# Patient Record
Sex: MALE | Race: WHITE | NOT HISPANIC OR LATINO
[De-identification: names, ages, dates, MRNs, and addresses within clinical notes are randomized per-mention and may not be internally consistent; named-entity substitution may affect disease eponyms.]

---

## 2017-02-01 ENCOUNTER — TRANSCRIPTION ENCOUNTER (OUTPATIENT)
Age: 52
End: 2017-02-01

## 2017-12-17 ENCOUNTER — TRANSCRIPTION ENCOUNTER (OUTPATIENT)
Age: 52
End: 2017-12-17

## 2019-10-15 ENCOUNTER — TRANSCRIPTION ENCOUNTER (OUTPATIENT)
Age: 54
End: 2019-10-15

## 2019-11-03 ENCOUNTER — FORM ENCOUNTER (OUTPATIENT)
Age: 54
End: 2019-11-03

## 2019-11-04 ENCOUNTER — OUTPATIENT (OUTPATIENT)
Dept: OUTPATIENT SERVICES | Facility: HOSPITAL | Age: 54
LOS: 1 days | End: 2019-11-04
Payer: COMMERCIAL

## 2019-11-04 ENCOUNTER — APPOINTMENT (OUTPATIENT)
Dept: ULTRASOUND IMAGING | Facility: CLINIC | Age: 54
End: 2019-11-04
Payer: COMMERCIAL

## 2019-11-04 ENCOUNTER — APPOINTMENT (OUTPATIENT)
Dept: UROLOGY | Facility: CLINIC | Age: 54
End: 2019-11-04
Payer: COMMERCIAL

## 2019-11-04 VITALS
HEIGHT: 71 IN | WEIGHT: 186 LBS | DIASTOLIC BLOOD PRESSURE: 88 MMHG | HEART RATE: 90 BPM | SYSTOLIC BLOOD PRESSURE: 140 MMHG | OXYGEN SATURATION: 97 % | BODY MASS INDEX: 26.04 KG/M2

## 2019-11-04 DIAGNOSIS — Z78.9 OTHER SPECIFIED HEALTH STATUS: ICD-10-CM

## 2019-11-04 DIAGNOSIS — Z80.42 FAMILY HISTORY OF MALIGNANT NEOPLASM OF PROSTATE: ICD-10-CM

## 2019-11-04 DIAGNOSIS — Z87.09 PERSONAL HISTORY OF OTHER DISEASES OF THE RESPIRATORY SYSTEM: ICD-10-CM

## 2019-11-04 DIAGNOSIS — Z00.8 ENCOUNTER FOR OTHER GENERAL EXAMINATION: ICD-10-CM

## 2019-11-04 PROBLEM — Z00.00 ENCOUNTER FOR PREVENTIVE HEALTH EXAMINATION: Status: ACTIVE | Noted: 2019-11-04

## 2019-11-04 PROCEDURE — 76870 US EXAM SCROTUM: CPT | Mod: 26

## 2019-11-04 PROCEDURE — 76870 US EXAM SCROTUM: CPT

## 2019-11-04 PROCEDURE — 99203 OFFICE O/P NEW LOW 30 MIN: CPT

## 2019-11-04 RX ORDER — FLUTICASONE PROPIONATE AND SALMETEROL 50; 100 UG/1; UG/1
100-50 POWDER RESPIRATORY (INHALATION)
Qty: 180 | Refills: 0 | Status: ACTIVE | COMMUNITY
Start: 2019-03-26

## 2019-11-04 RX ORDER — METHYLPREDNISOLONE 4 MG/1
4 TABLET ORAL
Qty: 21 | Refills: 0 | Status: ACTIVE | COMMUNITY
Start: 2019-10-15

## 2019-11-04 NOTE — PHYSICAL EXAM
[General Appearance - Well Developed] : well developed [Normal Appearance] : normal appearance [General Appearance - In No Acute Distress] : no acute distress [] : no respiratory distress [Abdomen Soft] : soft [Abdomen Tenderness] : non-tender [Abdomen Mass (___ Cm)] : no abdominal mass palpated [Costovertebral Angle Tenderness] : no ~M costovertebral angle tenderness [Urethral Meatus] : meatus normal [Penis Abnormality] : normal circumcised penis [FreeTextEntry1] : right enlarged scrotum, testis not felt separate from swelling, transillumination +, normal left testis pushed laterally  [Normal Station and Gait] : the gait and station were normal for the patient's age [Skin Color & Pigmentation] : normal skin color and pigmentation [No Focal Deficits] : no focal deficits [Oriented To Time, Place, And Person] : oriented to person, place, and time [No Palpable Adenopathy] : no palpable adenopathy

## 2019-11-04 NOTE — ASSESSMENT
[FreeTextEntry1] : Hydrocele:\par Discussed treatment options- continued observation, aspiration and surgical repair. Risks and benefits of each were discussed. \par The risks and complications of the procedure were extensively discussed with the patient. The general risks of this procedure include, but are not limited to bleeding, transfusion, infection, wound infection/dehiscence, pain, scaring of tissues, failure of the procedure, potential injury to other surrounding structures.\par The specific risks of this procedure include, but are not limited to: complete breakdown of the repair, prolonged wound drainage, injury to spermatic vessels, testicle and vas deferens which may lead to loss of testicle or future infertility problems. Possible injury to the ilioinguinal nerve is also possible and could lead to anesthetic areas on the scrotal, penile and inguinal skin. \par \par All questions were fully and satisfactorily answered. Pt will consider his options and if decides to decides to proceed will let us know. \par Will get Scrotal Ultrasound. Will inform results.\par

## 2019-11-04 NOTE — HISTORY OF PRESENT ILLNESS
[FreeTextEntry1] : 52 yo male presents for scrotal swelling. \par Right testis has been enlarged for years, last week increased swelling and some discomfort. \par Denies any difficulty with urination. \par Denies dysuria, hematuria, lower abdominal or flank pain, fever, chills or rigors.

## 2019-12-13 ENCOUNTER — APPOINTMENT (OUTPATIENT)
Dept: UROLOGY | Facility: CLINIC | Age: 54
End: 2019-12-13
Payer: COMMERCIAL

## 2019-12-13 VITALS — OXYGEN SATURATION: 98 % | SYSTOLIC BLOOD PRESSURE: 131 MMHG | HEART RATE: 80 BPM | DIASTOLIC BLOOD PRESSURE: 86 MMHG

## 2019-12-13 PROCEDURE — 76942 ECHO GUIDE FOR BIOPSY: CPT

## 2019-12-13 PROCEDURE — 55000 DRAINAGE OF HYDROCELE: CPT

## 2019-12-27 ENCOUNTER — APPOINTMENT (OUTPATIENT)
Dept: UROLOGY | Facility: CLINIC | Age: 54
End: 2019-12-27
Payer: COMMERCIAL

## 2019-12-27 PROCEDURE — 99213 OFFICE O/P EST LOW 20 MIN: CPT

## 2019-12-27 NOTE — PHYSICAL EXAM
[Urethral Meatus] : meatus normal [General Appearance - In No Acute Distress] : no acute distress [] : no respiratory distress [Oriented To Time, Place, And Person] : oriented to person, place, and time [Penis Abnormality] : normal circumcised penis [FreeTextEntry1] : right enlarged scrotum, less than before transillumination +

## 2019-12-27 NOTE — HISTORY OF PRESENT ILLNESS
[FreeTextEntry1] : 55 yo male presents for follow up. \par Has noticed increased swelling in the right scrotum, no pain or discomfort. \par \par Status post Ultrasound guided aspiration of right Hydrocele. \par \par Initially seen for scrotal swelling. \par Right testis has been enlarged for years, last week increased swelling and some discomfort. \par Denied any difficulty with urination. \par Denied dysuria, hematuria, lower abdominal or flank pain, fever, chills or rigors.

## 2020-02-25 ENCOUNTER — TRANSCRIPTION ENCOUNTER (OUTPATIENT)
Age: 55
End: 2020-02-25

## 2020-03-09 ENCOUNTER — FORM ENCOUNTER (OUTPATIENT)
Age: 55
End: 2020-03-09

## 2020-03-10 ENCOUNTER — OUTPATIENT (OUTPATIENT)
Dept: OUTPATIENT SERVICES | Facility: HOSPITAL | Age: 55
LOS: 1 days | End: 2020-03-10
Payer: COMMERCIAL

## 2020-03-10 VITALS
HEART RATE: 87 BPM | TEMPERATURE: 98 F | DIASTOLIC BLOOD PRESSURE: 81 MMHG | WEIGHT: 190.92 LBS | SYSTOLIC BLOOD PRESSURE: 117 MMHG | HEIGHT: 71 IN | OXYGEN SATURATION: 100 % | RESPIRATION RATE: 16 BRPM

## 2020-03-10 DIAGNOSIS — Z01.818 ENCOUNTER FOR OTHER PREPROCEDURAL EXAMINATION: ICD-10-CM

## 2020-03-10 DIAGNOSIS — Z98.890 OTHER SPECIFIED POSTPROCEDURAL STATES: Chronic | ICD-10-CM

## 2020-03-10 DIAGNOSIS — N43.3 HYDROCELE, UNSPECIFIED: ICD-10-CM

## 2020-03-10 LAB
ANION GAP SERPL CALC-SCNC: 5 MMOL/L — SIGNIFICANT CHANGE UP (ref 5–17)
APPEARANCE UR: CLEAR — SIGNIFICANT CHANGE UP
APTT BLD: 29.6 SEC — SIGNIFICANT CHANGE UP (ref 27.5–36.3)
BASOPHILS # BLD AUTO: 0.05 K/UL — SIGNIFICANT CHANGE UP (ref 0–0.2)
BASOPHILS NFR BLD AUTO: 1 % — SIGNIFICANT CHANGE UP (ref 0–2)
BILIRUB UR-MCNC: NEGATIVE — SIGNIFICANT CHANGE UP
BUN SERPL-MCNC: 16 MG/DL — SIGNIFICANT CHANGE UP (ref 7–23)
CALCIUM SERPL-MCNC: 9 MG/DL — SIGNIFICANT CHANGE UP (ref 8.5–10.1)
CHLORIDE SERPL-SCNC: 108 MMOL/L — SIGNIFICANT CHANGE UP (ref 96–108)
CO2 SERPL-SCNC: 28 MMOL/L — SIGNIFICANT CHANGE UP (ref 22–31)
COLOR SPEC: YELLOW — SIGNIFICANT CHANGE UP
CREAT SERPL-MCNC: 1.23 MG/DL — SIGNIFICANT CHANGE UP (ref 0.5–1.3)
DIFF PNL FLD: NEGATIVE — SIGNIFICANT CHANGE UP
EOSINOPHIL # BLD AUTO: 0.16 K/UL — SIGNIFICANT CHANGE UP (ref 0–0.5)
EOSINOPHIL NFR BLD AUTO: 3.1 % — SIGNIFICANT CHANGE UP (ref 0–6)
GLUCOSE SERPL-MCNC: 95 MG/DL — SIGNIFICANT CHANGE UP (ref 70–99)
GLUCOSE UR QL: NEGATIVE MG/DL — SIGNIFICANT CHANGE UP
HCT VFR BLD CALC: 42.3 % — SIGNIFICANT CHANGE UP (ref 39–50)
HGB BLD-MCNC: 14.1 G/DL — SIGNIFICANT CHANGE UP (ref 13–17)
IMM GRANULOCYTES NFR BLD AUTO: 0.4 % — SIGNIFICANT CHANGE UP (ref 0–1.5)
INR BLD: 0.98 RATIO — SIGNIFICANT CHANGE UP (ref 0.88–1.16)
KETONES UR-MCNC: NEGATIVE — SIGNIFICANT CHANGE UP
LEUKOCYTE ESTERASE UR-ACNC: NEGATIVE — SIGNIFICANT CHANGE UP
LYMPHOCYTES # BLD AUTO: 1.27 K/UL — SIGNIFICANT CHANGE UP (ref 1–3.3)
LYMPHOCYTES # BLD AUTO: 24.3 % — SIGNIFICANT CHANGE UP (ref 13–44)
MCHC RBC-ENTMCNC: 33.3 GM/DL — SIGNIFICANT CHANGE UP (ref 32–36)
MCHC RBC-ENTMCNC: 34.3 PG — HIGH (ref 27–34)
MCV RBC AUTO: 102.9 FL — HIGH (ref 80–100)
MONOCYTES # BLD AUTO: 0.47 K/UL — SIGNIFICANT CHANGE UP (ref 0–0.9)
MONOCYTES NFR BLD AUTO: 9 % — SIGNIFICANT CHANGE UP (ref 2–14)
NEUTROPHILS # BLD AUTO: 3.26 K/UL — SIGNIFICANT CHANGE UP (ref 1.8–7.4)
NEUTROPHILS NFR BLD AUTO: 62.2 % — SIGNIFICANT CHANGE UP (ref 43–77)
NITRITE UR-MCNC: NEGATIVE — SIGNIFICANT CHANGE UP
PH UR: 5 — SIGNIFICANT CHANGE UP (ref 5–8)
PLATELET # BLD AUTO: 288 K/UL — SIGNIFICANT CHANGE UP (ref 150–400)
POTASSIUM SERPL-MCNC: 4.3 MMOL/L — SIGNIFICANT CHANGE UP (ref 3.5–5.3)
POTASSIUM SERPL-SCNC: 4.3 MMOL/L — SIGNIFICANT CHANGE UP (ref 3.5–5.3)
PROT UR-MCNC: NEGATIVE MG/DL — SIGNIFICANT CHANGE UP
PROTHROM AB SERPL-ACNC: 10.9 SEC — SIGNIFICANT CHANGE UP (ref 10–12.9)
RBC # BLD: 4.11 M/UL — LOW (ref 4.2–5.8)
RBC # FLD: 12.7 % — SIGNIFICANT CHANGE UP (ref 10.3–14.5)
SODIUM SERPL-SCNC: 141 MMOL/L — SIGNIFICANT CHANGE UP (ref 135–145)
SP GR SPEC: 1.02 — SIGNIFICANT CHANGE UP (ref 1.01–1.02)
UROBILINOGEN FLD QL: NEGATIVE MG/DL — SIGNIFICANT CHANGE UP
WBC # BLD: 5.23 K/UL — SIGNIFICANT CHANGE UP (ref 3.8–10.5)
WBC # FLD AUTO: 5.23 K/UL — SIGNIFICANT CHANGE UP (ref 3.8–10.5)

## 2020-03-10 PROCEDURE — 93005 ELECTROCARDIOGRAM TRACING: CPT

## 2020-03-10 PROCEDURE — 81003 URINALYSIS AUTO W/O SCOPE: CPT

## 2020-03-10 PROCEDURE — 36415 COLL VENOUS BLD VENIPUNCTURE: CPT

## 2020-03-10 PROCEDURE — 85025 COMPLETE CBC W/AUTO DIFF WBC: CPT

## 2020-03-10 PROCEDURE — 80048 BASIC METABOLIC PNL TOTAL CA: CPT

## 2020-03-10 PROCEDURE — 87086 URINE CULTURE/COLONY COUNT: CPT

## 2020-03-10 PROCEDURE — 71046 X-RAY EXAM CHEST 2 VIEWS: CPT

## 2020-03-10 PROCEDURE — 86901 BLOOD TYPING SEROLOGIC RH(D): CPT

## 2020-03-10 PROCEDURE — 86900 BLOOD TYPING SEROLOGIC ABO: CPT

## 2020-03-10 PROCEDURE — 85610 PROTHROMBIN TIME: CPT

## 2020-03-10 PROCEDURE — 71046 X-RAY EXAM CHEST 2 VIEWS: CPT | Mod: 26

## 2020-03-10 PROCEDURE — G0463: CPT | Mod: 25

## 2020-03-10 PROCEDURE — 86850 RBC ANTIBODY SCREEN: CPT

## 2020-03-10 PROCEDURE — 93010 ELECTROCARDIOGRAM REPORT: CPT

## 2020-03-10 PROCEDURE — 85730 THROMBOPLASTIN TIME PARTIAL: CPT

## 2020-03-10 NOTE — H&P PST ADULT - HEALTH CARE MAINTENANCE
flu vaccine  current   Pt denies travel out of the country for the past 14 days denies cough fever shortness of breath

## 2020-03-10 NOTE — H&P PST ADULT - HISTORY OF PRESENT ILLNESS
54 year old male with right hydrocele.  Pt said hydrocele is increasing in size causing pressure and discomfort; denies fever chills, dysuria; he presents to PST for planned right hydrocelectomy

## 2020-03-10 NOTE — H&P PST ADULT - ASSESSMENT
54 year old male  he presents to PST for planned right hydrocelectomy     Plan:  1. PST instructions given ; NPO status instructions to be given by ASU   2. Pt instructed to take following meds with sip of water : advair sudafed   3. Labs EKG CXR as per surgeon request   4. Medical Optimization  with    5. Stop NSAIDS ( Aspirin Alev Motrin Mobic Diclofenac), herbal supplements , MVI , Vitamin fish oil 7 days prior to surgery  unless directed by surgeon or cardiologist; 54 year old male  he presents to PST for planned right hydrocelectomy     Plan:  1. PST instructions given ; NPO status instructions to be given by ASU   2. Pt instructed to take following meds with sip of water : advair   3. Labs EKG CXR as per surgeon request   4. Medical Optimization  with Dr   5. Stop NSAIDS ( Aspirin Alev Motrin Mobic Diclofenac), herbal supplements , MVI , Vitamin fish oil 7 days prior to surgery  unless directed by surgeon or cardiologist;

## 2020-03-10 NOTE — H&P PST ADULT - NSICDXPASTMEDICALHX_GEN_ALL_CORE_FT
PAST MEDICAL HISTORY:  Asthma controlled; never intubated    Disorder of Eustachian tube, bilateral     Hydrocele right    Neck pain

## 2020-03-10 NOTE — H&P PST ADULT - NSANTHOSAYNRD_GEN_A_CORE
No. HEMAL screening performed.  STOP BANG Legend: 0-2 = LOW Risk; 3-4 = INTERMEDIATE Risk; 5-8 = HIGH Risk

## 2020-03-11 DIAGNOSIS — N43.3 HYDROCELE, UNSPECIFIED: ICD-10-CM

## 2020-03-11 DIAGNOSIS — Z01.818 ENCOUNTER FOR OTHER PREPROCEDURAL EXAMINATION: ICD-10-CM

## 2020-03-11 LAB
CULTURE RESULTS: NO GROWTH — SIGNIFICANT CHANGE UP
SPECIMEN SOURCE: SIGNIFICANT CHANGE UP

## 2020-03-16 PROBLEM — N43.3 HYDROCELE, UNSPECIFIED: Chronic | Status: ACTIVE | Noted: 2020-03-10

## 2020-03-16 PROBLEM — H69.93 UNSPECIFIED EUSTACHIAN TUBE DISORDER, BILATERAL: Chronic | Status: ACTIVE | Noted: 2020-03-10

## 2020-03-16 PROBLEM — J45.909 UNSPECIFIED ASTHMA, UNCOMPLICATED: Chronic | Status: ACTIVE | Noted: 2020-03-10

## 2020-03-17 ENCOUNTER — APPOINTMENT (OUTPATIENT)
Dept: UROLOGY | Facility: HOSPITAL | Age: 55
End: 2020-03-17

## 2020-03-20 ENCOUNTER — APPOINTMENT (OUTPATIENT)
Dept: UROLOGY | Facility: CLINIC | Age: 55
End: 2020-03-20

## 2020-03-20 ENCOUNTER — EMERGENCY (EMERGENCY)
Facility: HOSPITAL | Age: 55
LOS: 0 days | Discharge: HOME | End: 2020-03-20
Attending: EMERGENCY MEDICINE
Payer: COMMERCIAL

## 2020-03-20 VITALS
RESPIRATION RATE: 17 BRPM | HEART RATE: 80 BPM | OXYGEN SATURATION: 100 % | SYSTOLIC BLOOD PRESSURE: 152 MMHG | DIASTOLIC BLOOD PRESSURE: 103 MMHG

## 2020-03-20 VITALS — WEIGHT: 195.11 LBS | HEIGHT: 71 IN

## 2020-03-20 DIAGNOSIS — N50.811 RIGHT TESTICULAR PAIN: ICD-10-CM

## 2020-03-20 DIAGNOSIS — N50.89 OTHER SPECIFIED DISORDERS OF THE MALE GENITAL ORGANS: ICD-10-CM

## 2020-03-20 DIAGNOSIS — J45.909 UNSPECIFIED ASTHMA, UNCOMPLICATED: ICD-10-CM

## 2020-03-20 DIAGNOSIS — Z98.890 OTHER SPECIFIED POSTPROCEDURAL STATES: ICD-10-CM

## 2020-03-20 DIAGNOSIS — I10 ESSENTIAL (PRIMARY) HYPERTENSION: ICD-10-CM

## 2020-03-20 DIAGNOSIS — Z88.2 ALLERGY STATUS TO SULFONAMIDES: ICD-10-CM

## 2020-03-20 DIAGNOSIS — N43.3 HYDROCELE, UNSPECIFIED: ICD-10-CM

## 2020-03-20 DIAGNOSIS — Z91.09 OTHER ALLERGY STATUS, OTHER THAN TO DRUGS AND BIOLOGICAL SUBSTANCES: ICD-10-CM

## 2020-03-20 DIAGNOSIS — Z86.69 PERSONAL HISTORY OF OTHER DISEASES OF THE NERVOUS SYSTEM AND SENSE ORGANS: ICD-10-CM

## 2020-03-20 DIAGNOSIS — Z98.890 OTHER SPECIFIED POSTPROCEDURAL STATES: Chronic | ICD-10-CM

## 2020-03-20 DIAGNOSIS — Z79.51 LONG TERM (CURRENT) USE OF INHALED STEROIDS: ICD-10-CM

## 2020-03-20 DIAGNOSIS — N50.812 LEFT TESTICULAR PAIN: ICD-10-CM

## 2020-03-20 LAB
APPEARANCE UR: CLEAR — SIGNIFICANT CHANGE UP
BILIRUB UR-MCNC: NEGATIVE — SIGNIFICANT CHANGE UP
COLOR SPEC: YELLOW — SIGNIFICANT CHANGE UP
DIFF PNL FLD: NEGATIVE — SIGNIFICANT CHANGE UP
GLUCOSE UR QL: NEGATIVE MG/DL — SIGNIFICANT CHANGE UP
KETONES UR-MCNC: NEGATIVE — SIGNIFICANT CHANGE UP
LEUKOCYTE ESTERASE UR-ACNC: ABNORMAL
NITRITE UR-MCNC: NEGATIVE — SIGNIFICANT CHANGE UP
PH UR: 7 — SIGNIFICANT CHANGE UP (ref 5–8)
PROT UR-MCNC: NEGATIVE MG/DL — SIGNIFICANT CHANGE UP
SP GR SPEC: 1.01 — SIGNIFICANT CHANGE UP (ref 1.01–1.02)
UROBILINOGEN FLD QL: NEGATIVE MG/DL — SIGNIFICANT CHANGE UP

## 2020-03-20 PROCEDURE — 81001 URINALYSIS AUTO W/SCOPE: CPT

## 2020-03-20 PROCEDURE — 76870 US EXAM SCROTUM: CPT

## 2020-03-20 PROCEDURE — 71045 X-RAY EXAM CHEST 1 VIEW: CPT

## 2020-03-20 PROCEDURE — 76870 US EXAM SCROTUM: CPT | Mod: 26

## 2020-03-20 PROCEDURE — 87086 URINE CULTURE/COLONY COUNT: CPT

## 2020-03-20 PROCEDURE — 99284 EMERGENCY DEPT VISIT MOD MDM: CPT | Mod: 25

## 2020-03-20 PROCEDURE — 99284 EMERGENCY DEPT VISIT MOD MDM: CPT

## 2020-03-20 PROCEDURE — 71045 X-RAY EXAM CHEST 1 VIEW: CPT | Mod: 26

## 2020-03-20 RX ORDER — ACETAMINOPHEN 500 MG
1000 TABLET ORAL ONCE
Refills: 0 | Status: COMPLETED | OUTPATIENT
Start: 2020-03-20 | End: 2020-03-20

## 2020-03-20 RX ADMIN — Medication 1000 MILLIGRAM(S): at 18:58

## 2020-03-20 NOTE — ED STATDOCS - PROGRESS NOTE DETAILS
54 yr. old male PMH: Asthma, Hydrocele presents to ED with testicular swelling and pain. Reports he was due for surgery last Tuesday and procedure was cancelled. Feels the hydrocele ruptured. No fever or chills. No recent travel. Seen and examined by attending in intake. Plan: US, Tylenol and UA/UC. Will F/U with results and re evaluate. Anabela CANDELARIO Dr. Silverio called and consulted covering for Dr. Terry. Reviewed results of US with Dr. Silverio and PE: abd. non tender , BS+ no organomegaly. Scrotum non tender + swelling but soft. MTangredi NP Reviewed results of Lab work and US with patient and agreed to be discharged amnd F/U with Dr. Terry. Anabela CANDELARIO

## 2020-03-20 NOTE — ED STATDOCS - PATIENT PORTAL LINK FT
You can access the FollowMyHealth Patient Portal offered by [facility] by registering at the following website: [email or web address]. By joining [employer]’s FollowMyHealth portal, you will also be able to view your health information using other applications (apps) compatible with our system.

## 2020-03-20 NOTE — ED STATDOCS - CLINICAL SUMMARY MEDICAL DECISION MAKING FREE TEXT BOX
Plan: Sonogram of testes to r/o torsion and eval for size and rupture of hydrocele, will contact Dr. Terry. Plan: Sonogram of testes to r/o torsion and eval for size and rupture of hydrocele, will contact Dr. Terry. Contacted Dr. VERONIQUE Silverio with results of US and agreed to discharge patient to F/U with Dr. Terry. Plan: Sonogram of testes to r/o torsion and eval for size and rupture of hydrocele, will contact Dr. Terry. Contacted Dr. VERONIQUE Silverio with results of US and agreed to discharge patient to F/U with Dr. Teryr.    Veronika SOARES: Spoke with Dr. Jass Gallego who will follow up with patient outpatient and is OK with care provided in the ED. Outpatient follow up is appreciated.

## 2020-03-20 NOTE — ED PROVIDER NOTE - PROGRESS NOTE DETAILS
Patient is very anxious. Hypertensive around 150/102 on repeat BP. Had a full discussion with patient. Patient states he is a patient of Dr. Gallego. Discussed the benefit of treatment in the ED vs. outpatient treatment. Given that patient has anxiety, and BP is likely from anxiety, wishes to go home. x-ray is negative for CHF findings, repeated discussion with Dr. Silverio after repeated exam is unremarkable. Patient opts to go home, will provide patient with a script for amlodipine. However fully educated patient explaining that new BP modulating medications need time to show their efficacy and that generally it is not safe to start a patient on BP modulating medication specially since patient is likey anxious and HTN could likely from it. Patient understands, states will not take the meds until calling Dr. Gallego tomorrow and repeating his BP in the morning.

## 2020-03-20 NOTE — ED STATDOCS - NS_ ATTENDINGSCRIBEDETAILS _ED_A_ED_FT
I Khang Banda MD saw and examined the patient. Scribe documented for me and under my supervision. I have modified the scribe's documentation where necessary to reflect my history, physical exam and other relevant documentations pertinent to the care of the patient.

## 2020-03-20 NOTE — ED STATDOCS - NSFOLLOWUPINSTRUCTIONS_ED_ALL_ED_FT
EnglishSpanish    Hydrocele, Adult  A hydrocele is a collection of fluid in the loose pouch of skin that holds the testicles (scrotum). This may happen because:  The amount of fluid produced in the scrotum is not absorbed by the rest of the body.Fluid from the abdomen fills the scrotum. Normally, the testicles develop in the abdomen then move (drop) into to the scrotum before birth. The tube that the testicles travel through usually closes after the testicles drop. If the tube does not close, fluid from the abdomen can fill the scrotum. This is less common in adults.What are the causes?  The cause of a hydrocele in adults is usually not known. However, it may be caused by:  An injury to the scrotum.An infection (epididymitis).Decreased blood flow to the scrotum.Twisting of a testicle (testicular torsion).A birth defect.A tumor or cancer of the testicle.What are the signs or symptoms?  A hydrocele feels like a water-filled balloon. It may also feel heavy. Other symptoms include:  Swelling of the scrotum. The swelling may decrease when you lie down. You may also notice more swelling at night than in the morning.Swelling of the groin.Mild discomfort in the scrotum.Pain. This can develop if the hydrocele was caused by infection or twisting. The larger the hydrocele, the more likely you are to have pain.How is this diagnosed?  This condition may be diagnosed based on:  Physical exam.Medical history.You may also have other tests, including:  Imaging tests, such as ultrasound.Blood or urine tests.How is this treated?  Most hydroceles go away on their own. If you have no discomfort or pain, your health care provider may suggest close monitoring of your condition (called watch and wait or watchful waiting) until the condition goes away or symptoms develop. If treatment is needed, it may include:  Treating an underlying condition. This may include using an antibiotic medicine to treat an infection.Surgery to stop fluid from collecting in the scrotum.Surgery to drain the fluid. Options include:  Needle aspiration. A needle is used to drain fluid. However, the fluid buildup will come back quickly.Hydrocelectomy. For this procedure, an incision is made in the scrotum to remove the fluid sac.Follow these instructions at home:  Watch the hydrocele for any changes.Take over-the-counter and prescription medicines only as told by your health care provider.If you were prescribed an antibiotic medicine, use it as told by your health care provider. Do not stop taking the antibiotic even if you start to feel better.Keep all follow-up visits as told by your health care provider. This is important.Contact a health care provider if:  You notice any changes in the hydrocele.The swelling in your scrotum or groin gets worse.The hydrocele becomes red, firm, painful, or tender to the touch.You have a fever.Get help right away if you:  Develop a lot of pain, or your pain becomes worse.Summary  A hydrocele is a collection of fluid in the loose pouch of skin that holds the testicles (scrotum).Hydroceles can cause swelling, discomfort, and sometimes pain.In adults, the cause of a hydrocele usually is not known. However, it is sometimes caused by an infection or a rotation and twisting of the scrotum.Treatment is usually not needed. Hydroceles often go away on their own. If a hydrocele causes pain, treatment may be given to ease the pain.This information is not intended to replace advice given to you by your health care provider. Make sure you discuss any questions you have with your health care provider.    Rest. Drink plenty of fluids. Follow up with Dr. Terry. Hydrocele, Adult  A hydrocele is a collection of fluid in the loose pouch of skin that holds the testicles (scrotum). This may happen because:  The amount of fluid produced in the scrotum is not absorbed by the rest of the body.Fluid from the abdomen fills the scrotum. Normally, the testicles develop in the abdomen then move (drop) into to the scrotum before birth. The tube that the testicles travel through usually closes after the testicles drop. If the tube does not close, fluid from the abdomen can fill the scrotum. This is less common in adults.What are the causes?  The cause of a hydrocele in adults is usually not known. However, it may be caused by:  An injury to the scrotum.An infection (epididymitis).Decreased blood flow to the scrotum.Twisting of a testicle (testicular torsion).A birth defect.A tumor or cancer of the testicle.What are the signs or symptoms?  A hydrocele feels like a water-filled balloon. It may also feel heavy. Other symptoms include:  Swelling of the scrotum. The swelling may decrease when you lie down. You may also notice more swelling at night than in the morning.Swelling of the groin.Mild discomfort in the scrotum.Pain. This can develop if the hydrocele was caused by infection or twisting. The larger the hydrocele, the more likely you are to have pain.How is this diagnosed?  This condition may be diagnosed based on:  Physical exam.Medical history.You may also have other tests, including:  Imaging tests, such as ultrasound.Blood or urine tests.How is this treated?  Most hydroceles go away on their own. If you have no discomfort or pain, your health care provider may suggest close monitoring of your condition (called watch and wait or watchful waiting) until the condition goes away or symptoms develop. If treatment is needed, it may include:  Treating an underlying condition. This may include using an antibiotic medicine to treat an infection.Surgery to stop fluid from collecting in the scrotum.Surgery to drain the fluid. Options include:  Needle aspiration. A needle is used to drain fluid. However, the fluid buildup will come back quickly.Hydrocelectomy. For this procedure, an incision is made in the scrotum to remove the fluid sac.Follow these instructions at home:  Watch the hydrocele for any changes.Take over-the-counter and prescription medicines only as told by your health care provider.If you were prescribed an antibiotic medicine, use it as told by your health care provider. Do not stop taking the antibiotic even if you start to feel better.Keep all follow-up visits as told by your health care provider. This is important.Contact a health care provider if:  You notice any changes in the hydrocele.The swelling in your scrotum or groin gets worse.The hydrocele becomes red, firm, painful, or tender to the touch.You have a fever.Get help right away if you:  Develop a lot of pain, or your pain becomes worse.Summary  A hydrocele is a collection of fluid in the loose pouch of skin that holds the testicles (scrotum).Hydroceles can cause swelling, discomfort, and sometimes pain.In adults, the cause of a hydrocele usually is not known. However, it is sometimes caused by an infection or a rotation and twisting of the scrotum.Treatment is usually not needed. Hydroceles often go away on their own. If a hydrocele causes pain, treatment may be given to ease the pain.This information is not intended to replace advice given to you by your health care provider. Make sure you discuss any questions you have with your health care provider.    Rest. Drink plenty of fluids. Follow up with Dr. Terry.

## 2020-03-20 NOTE — ED STATDOCS - PMH
Asthma  controlled; never intubated  Disorder of Eustachian tube, bilateral    Hydrocele  right  Neck pain

## 2020-03-20 NOTE — ED STATDOCS - OBJECTIVE STATEMENT
55 y/o M with a PMHx of Asthma, Hydrocele presenting to the ED c/o testicular pain. Patient reports that he was a known hydrocele, and was due for surgery for x4 days ago but was cancelled. Patient reports that he was due for drainage of the area today, and believes that the hydrocele ruptured, with increased pain and swelling. Severity of the pain is a 6/10. Denies recent travel, SOB, cough, N/V/D, fever or chills. No illicit drug use, Non smoker, Social EtOH use.

## 2020-03-20 NOTE — ED STATDOCS - GENITOURINARY, MLM
normal... no bladder tenderness, no bilateral CVA tenderness. +TTP of b/l right more than left, swelling in both testes R more than left intact, sensations in inner thigh intact, no erythema or sign of infection

## 2020-03-20 NOTE — ED STATDOCS - MUSCULOSKELETAL, MLM
range of motion is not limited and there is no muscle tenderness. range of motion is not limited and there is no muscle tenderness. 5/5 strength on flexion and extension of all limbs. No nuchal rigidity. NIH=0 GCS=15

## 2020-03-20 NOTE — ED ADULT NURSE NOTE - OBJECTIVE STATEMENT
Patient presenting to the ED c/o testicular pain. Patient reports that he was a known hydrocele, and was due for surgery for x4 days ago but was cancelled. Patient reports that he was due for drainage of the area today, and believes that the hydrocele ruptured, with increased pain and swelling. Severity of the pain is a 6/10.

## 2020-03-20 NOTE — ED STATDOCS - ATTENDING CONTRIBUTION TO CARE
I Khang Banda MD saw and examined the patient. MLP saw and examined the patient under my supervision. I discussed the care of the patient with MLP and agree with MLP's plan, assessment and care of the patient while in the ED.

## 2020-03-20 NOTE — ED STATDOCS - CARE PROVIDER_API CALL
Sukhdev Terry)  Urology  [address] [facility], [address] [place] [address]  Phone: [phone]  Fax: [phone]  Follow Up Time:

## 2020-03-21 LAB
CULTURE RESULTS: SIGNIFICANT CHANGE UP
SPECIMEN SOURCE: SIGNIFICANT CHANGE UP

## 2020-03-24 ENCOUNTER — OTHER (OUTPATIENT)
Age: 55
End: 2020-03-24

## 2020-03-25 ENCOUNTER — APPOINTMENT (OUTPATIENT)
Dept: UROLOGY | Facility: CLINIC | Age: 55
End: 2020-03-25

## 2020-04-01 ENCOUNTER — APPOINTMENT (OUTPATIENT)
Dept: UROLOGY | Facility: CLINIC | Age: 55
End: 2020-04-01

## 2020-05-01 ENCOUNTER — EMERGENCY (EMERGENCY)
Facility: HOSPITAL | Age: 55
LOS: 0 days | Discharge: HOME | End: 2020-05-01
Attending: EMERGENCY MEDICINE
Payer: COMMERCIAL

## 2020-05-01 VITALS
HEART RATE: 80 BPM | DIASTOLIC BLOOD PRESSURE: 68 MMHG | RESPIRATION RATE: 16 BRPM | TEMPERATURE: 98 F | SYSTOLIC BLOOD PRESSURE: 134 MMHG | OXYGEN SATURATION: 100 %

## 2020-05-01 VITALS — WEIGHT: 175.05 LBS

## 2020-05-01 DIAGNOSIS — Z20.828 CONTACT WITH AND (SUSPECTED) EXPOSURE TO OTHER VIRAL COMMUNICABLE DISEASES: ICD-10-CM

## 2020-05-01 DIAGNOSIS — R06.02 SHORTNESS OF BREATH: ICD-10-CM

## 2020-05-01 DIAGNOSIS — Z79.51 LONG TERM (CURRENT) USE OF INHALED STEROIDS: ICD-10-CM

## 2020-05-01 DIAGNOSIS — Z88.2 ALLERGY STATUS TO SULFONAMIDES: ICD-10-CM

## 2020-05-01 DIAGNOSIS — J45.909 UNSPECIFIED ASTHMA, UNCOMPLICATED: ICD-10-CM

## 2020-05-01 DIAGNOSIS — Z91.09 OTHER ALLERGY STATUS, OTHER THAN TO DRUGS AND BIOLOGICAL SUBSTANCES: ICD-10-CM

## 2020-05-01 DIAGNOSIS — Z98.890 OTHER SPECIFIED POSTPROCEDURAL STATES: Chronic | ICD-10-CM

## 2020-05-01 LAB
ALBUMIN SERPL ELPH-MCNC: 4 G/DL — SIGNIFICANT CHANGE UP (ref 3.3–5)
ALP SERPL-CCNC: 97 U/L — SIGNIFICANT CHANGE UP (ref 40–120)
ALT FLD-CCNC: 43 U/L — SIGNIFICANT CHANGE UP (ref 12–78)
ANION GAP SERPL CALC-SCNC: 6 MMOL/L — SIGNIFICANT CHANGE UP (ref 5–17)
AST SERPL-CCNC: 30 U/L — SIGNIFICANT CHANGE UP (ref 15–37)
BILIRUB SERPL-MCNC: 0.4 MG/DL — SIGNIFICANT CHANGE UP (ref 0.2–1.2)
BUN SERPL-MCNC: 18 MG/DL — SIGNIFICANT CHANGE UP (ref 7–23)
CALCIUM SERPL-MCNC: 9 MG/DL — SIGNIFICANT CHANGE UP (ref 8.5–10.1)
CHLORIDE SERPL-SCNC: 106 MMOL/L — SIGNIFICANT CHANGE UP (ref 96–108)
CO2 SERPL-SCNC: 26 MMOL/L — SIGNIFICANT CHANGE UP (ref 22–31)
CREAT SERPL-MCNC: 1.05 MG/DL — SIGNIFICANT CHANGE UP (ref 0.5–1.3)
D DIMER BLD IA.RAPID-MCNC: 195 NG/ML DDU — SIGNIFICANT CHANGE UP
GLUCOSE SERPL-MCNC: 89 MG/DL — SIGNIFICANT CHANGE UP (ref 70–99)
HCT VFR BLD CALC: 41.9 % — SIGNIFICANT CHANGE UP (ref 39–50)
HGB BLD-MCNC: 13.9 G/DL — SIGNIFICANT CHANGE UP (ref 13–17)
MCHC RBC-ENTMCNC: 33.2 GM/DL — SIGNIFICANT CHANGE UP (ref 32–36)
MCHC RBC-ENTMCNC: 33.4 PG — SIGNIFICANT CHANGE UP (ref 27–34)
MCV RBC AUTO: 100.7 FL — HIGH (ref 80–100)
PLATELET # BLD AUTO: 259 K/UL — SIGNIFICANT CHANGE UP (ref 150–400)
POTASSIUM SERPL-MCNC: 3.7 MMOL/L — SIGNIFICANT CHANGE UP (ref 3.5–5.3)
POTASSIUM SERPL-SCNC: 3.7 MMOL/L — SIGNIFICANT CHANGE UP (ref 3.5–5.3)
PROT SERPL-MCNC: 7.5 GM/DL — SIGNIFICANT CHANGE UP (ref 6–8.3)
RBC # BLD: 4.16 M/UL — LOW (ref 4.2–5.8)
RBC # FLD: 13 % — SIGNIFICANT CHANGE UP (ref 10.3–14.5)
SARS-COV-2 RNA SPEC QL NAA+PROBE: SIGNIFICANT CHANGE UP
SODIUM SERPL-SCNC: 138 MMOL/L — SIGNIFICANT CHANGE UP (ref 135–145)
TROPONIN I SERPL-MCNC: <0.015 NG/ML — SIGNIFICANT CHANGE UP (ref 0.01–0.04)
WBC # BLD: 5.2 K/UL — SIGNIFICANT CHANGE UP (ref 3.8–10.5)
WBC # FLD AUTO: 5.2 K/UL — SIGNIFICANT CHANGE UP (ref 3.8–10.5)

## 2020-05-01 PROCEDURE — 71045 X-RAY EXAM CHEST 1 VIEW: CPT

## 2020-05-01 PROCEDURE — 84484 ASSAY OF TROPONIN QUANT: CPT

## 2020-05-01 PROCEDURE — 71045 X-RAY EXAM CHEST 1 VIEW: CPT | Mod: 26

## 2020-05-01 PROCEDURE — 80053 COMPREHEN METABOLIC PANEL: CPT

## 2020-05-01 PROCEDURE — 36415 COLL VENOUS BLD VENIPUNCTURE: CPT

## 2020-05-01 PROCEDURE — 93010 ELECTROCARDIOGRAM REPORT: CPT

## 2020-05-01 PROCEDURE — 99284 EMERGENCY DEPT VISIT MOD MDM: CPT | Mod: 25

## 2020-05-01 PROCEDURE — 87635 SARS-COV-2 COVID-19 AMP PRB: CPT

## 2020-05-01 PROCEDURE — 93005 ELECTROCARDIOGRAM TRACING: CPT

## 2020-05-01 PROCEDURE — 99285 EMERGENCY DEPT VISIT HI MDM: CPT

## 2020-05-01 PROCEDURE — 85379 FIBRIN DEGRADATION QUANT: CPT

## 2020-05-01 PROCEDURE — 85027 COMPLETE CBC AUTOMATED: CPT

## 2020-05-01 NOTE — ED PROVIDER NOTE - CLINICAL SUMMARY MEDICAL DECISION MAKING FREE TEXT BOX
Pt well appearing.  Given ongoing SOB told to come to ED for eval by Dr. Gallego.  EKG non-ischemic.  no active cp or exertional symptoms to suggest acs.  HEART score is 1.  Pt PERC negative aside from age and no preceding risk factors for PE.  No e/o ptx/pna/carditis.  Story not c/w dissection - below threshold for further w/u.  No suggestion of asthma on exam.  Will send single troponin given timing of symptoms.  Dispo pending labs, imaging and reassessment. Pt well appearing.  Given ongoing SOB told to come to ED for eval by Dr. Gallego.  EKG non-ischemic.  no active cp or exertional symptoms to suggest acs.  HEART score is 1.  Pt PERC negative aside from age and no preceding risk factors for PE.  Dimer negative.  No e/o ptx/pna/carditis.  Story not c/w dissection - below threshold for further w/u.  No suggestion of asthma on exam.  Will send single troponin given timing of symptoms.  Dispo pending labs, imaging and reassessment.

## 2020-05-01 NOTE — ED PROVIDER NOTE - OBJECTIVE STATEMENT
54 y M pmh of asthma presenting with sob ongoing for last 2-3 weeks.  Denies f/c/n/v/d/cough.  No CP.  No exertional component.  Refractory to ventolin and advair.  Gets this annually typically, but this has been lasting longer.  Was covid antibody tested a week ago, but no results yet.  Does do EMS runs.  No LE edema.  No recent surgeries or travel.

## 2020-05-01 NOTE — ED PROVIDER NOTE - NS ED ROS FT
Constitutional: nad, well appearing  HEENT:  no nasal congestion, eye drainage or ear pain.    CVS:  no cp  Resp:  + sob, no cough  GI:  no abdominal pain, no nausea or vomiting  :  no dysuria  MSK: no joint pain or limited ROM  Skin: no rash  Neuro: no change in mental status or level of consciousness  Heme/lymph: no bleeding

## 2020-05-01 NOTE — ED PROVIDER NOTE - PATIENT PORTAL LINK FT
You can access the FollowMyHealth Patient Portal offered by [facility] by registering at the following website: [email or web address]. By joining [employer]’s FollowMyHealth portal, you will also be able to view your health information using other applications (apps) compatible with our system.

## 2020-05-01 NOTE — ED ADULT NURSE NOTE - OBJECTIVE STATEMENT
pt presents to ed from home, pt alert and orientedx4, VSS afebrule, pt c/o multiple weeks of SOb, no cough congestion, chest pain dizzines sand weakness. pt states he is an asthmatic and feels his proventil inhaler is uneffective. pt states he was not swabbed for covid but had antibody testing performed one week ago and has not recevied his results yet, safety maintained, pt speaking clearly with no signs of repsiratory distress, pt on room air, will continue to monitor

## 2020-05-01 NOTE — ED ADULT TRIAGE NOTE - CHIEF COMPLAINT QUOTE
patient presents to Emergency room due to complaints of shortness of breathe. worsening over the past few weeks antibody testing done last Saturday no results yet

## 2020-05-01 NOTE — ED PROVIDER NOTE - PHYSICAL EXAMINATION
Constitutional: NAD, well appearing  HEENT: no rhinorrhea  CVS:  RRR, no m/r/g  Resp:  CTAB  GI: soft, ntnd  MSK:  no restriction to rom, full ROM to all extremities  Neuro:  A&Ox3, 5/5 strength to all extremities,  SILT to all extremities  Skin: no rash  psych: clear thought content  Heme/lymph:  No LAD

## 2020-07-28 ENCOUNTER — OUTPATIENT (OUTPATIENT)
Dept: OUTPATIENT SERVICES | Facility: HOSPITAL | Age: 55
LOS: 1 days | End: 2020-07-28
Payer: COMMERCIAL

## 2020-07-28 VITALS
WEIGHT: 188.94 LBS | TEMPERATURE: 98 F | RESPIRATION RATE: 16 BRPM | SYSTOLIC BLOOD PRESSURE: 135 MMHG | DIASTOLIC BLOOD PRESSURE: 81 MMHG | HEIGHT: 71 IN | HEART RATE: 84 BPM | OXYGEN SATURATION: 100 %

## 2020-07-28 DIAGNOSIS — N43.3 HYDROCELE, UNSPECIFIED: ICD-10-CM

## 2020-07-28 DIAGNOSIS — Z98.890 OTHER SPECIFIED POSTPROCEDURAL STATES: Chronic | ICD-10-CM

## 2020-07-28 DIAGNOSIS — Z01.818 ENCOUNTER FOR OTHER PREPROCEDURAL EXAMINATION: ICD-10-CM

## 2020-07-28 LAB
ANION GAP SERPL CALC-SCNC: 4 MMOL/L — LOW (ref 5–17)
APPEARANCE UR: CLEAR — SIGNIFICANT CHANGE UP
APTT BLD: 31.8 SEC — SIGNIFICANT CHANGE UP (ref 27.5–35.5)
BASOPHILS # BLD AUTO: 0.05 K/UL — SIGNIFICANT CHANGE UP (ref 0–0.2)
BASOPHILS NFR BLD AUTO: 0.9 % — SIGNIFICANT CHANGE UP (ref 0–2)
BILIRUB UR-MCNC: NEGATIVE — SIGNIFICANT CHANGE UP
BUN SERPL-MCNC: 14 MG/DL — SIGNIFICANT CHANGE UP (ref 7–23)
CALCIUM SERPL-MCNC: 8.5 MG/DL — SIGNIFICANT CHANGE UP (ref 8.5–10.1)
CHLORIDE SERPL-SCNC: 110 MMOL/L — HIGH (ref 96–108)
CO2 SERPL-SCNC: 29 MMOL/L — SIGNIFICANT CHANGE UP (ref 22–31)
COLOR SPEC: YELLOW — SIGNIFICANT CHANGE UP
CREAT SERPL-MCNC: 1.05 MG/DL — SIGNIFICANT CHANGE UP (ref 0.5–1.3)
DIFF PNL FLD: ABNORMAL
EOSINOPHIL # BLD AUTO: 0.2 K/UL — SIGNIFICANT CHANGE UP (ref 0–0.5)
EOSINOPHIL NFR BLD AUTO: 3.7 % — SIGNIFICANT CHANGE UP (ref 0–6)
GLUCOSE SERPL-MCNC: 90 MG/DL — SIGNIFICANT CHANGE UP (ref 70–99)
GLUCOSE UR QL: NEGATIVE MG/DL — SIGNIFICANT CHANGE UP
HCT VFR BLD CALC: 40.2 % — SIGNIFICANT CHANGE UP (ref 39–50)
HGB BLD-MCNC: 13.4 G/DL — SIGNIFICANT CHANGE UP (ref 13–17)
IMM GRANULOCYTES NFR BLD AUTO: 0.2 % — SIGNIFICANT CHANGE UP (ref 0–1.5)
INR BLD: 0.92 RATIO — SIGNIFICANT CHANGE UP (ref 0.88–1.16)
KETONES UR-MCNC: NEGATIVE — SIGNIFICANT CHANGE UP
LEUKOCYTE ESTERASE UR-ACNC: NEGATIVE — SIGNIFICANT CHANGE UP
LYMPHOCYTES # BLD AUTO: 1.01 K/UL — SIGNIFICANT CHANGE UP (ref 1–3.3)
LYMPHOCYTES # BLD AUTO: 18.5 % — SIGNIFICANT CHANGE UP (ref 13–44)
MCHC RBC-ENTMCNC: 33.3 GM/DL — SIGNIFICANT CHANGE UP (ref 32–36)
MCHC RBC-ENTMCNC: 33.8 PG — SIGNIFICANT CHANGE UP (ref 27–34)
MCV RBC AUTO: 101.5 FL — HIGH (ref 80–100)
MONOCYTES # BLD AUTO: 0.6 K/UL — SIGNIFICANT CHANGE UP (ref 0–0.9)
MONOCYTES NFR BLD AUTO: 11 % — SIGNIFICANT CHANGE UP (ref 2–14)
NEUTROPHILS # BLD AUTO: 3.59 K/UL — SIGNIFICANT CHANGE UP (ref 1.8–7.4)
NEUTROPHILS NFR BLD AUTO: 65.7 % — SIGNIFICANT CHANGE UP (ref 43–77)
NITRITE UR-MCNC: NEGATIVE — SIGNIFICANT CHANGE UP
PH UR: 7 — SIGNIFICANT CHANGE UP (ref 5–8)
PLATELET # BLD AUTO: 254 K/UL — SIGNIFICANT CHANGE UP (ref 150–400)
POTASSIUM SERPL-MCNC: 4.2 MMOL/L — SIGNIFICANT CHANGE UP (ref 3.5–5.3)
POTASSIUM SERPL-SCNC: 4.2 MMOL/L — SIGNIFICANT CHANGE UP (ref 3.5–5.3)
PROT UR-MCNC: NEGATIVE MG/DL — SIGNIFICANT CHANGE UP
PROTHROM AB SERPL-ACNC: 10.7 SEC — SIGNIFICANT CHANGE UP (ref 10.6–13.6)
RBC # BLD: 3.96 M/UL — LOW (ref 4.2–5.8)
RBC # FLD: 12.7 % — SIGNIFICANT CHANGE UP (ref 10.3–14.5)
SODIUM SERPL-SCNC: 143 MMOL/L — SIGNIFICANT CHANGE UP (ref 135–145)
SP GR SPEC: 1.01 — SIGNIFICANT CHANGE UP (ref 1.01–1.02)
UROBILINOGEN FLD QL: 1 MG/DL
WBC # BLD: 5.46 K/UL — SIGNIFICANT CHANGE UP (ref 3.8–10.5)
WBC # FLD AUTO: 5.46 K/UL — SIGNIFICANT CHANGE UP (ref 3.8–10.5)

## 2020-07-28 PROCEDURE — 93005 ELECTROCARDIOGRAM TRACING: CPT

## 2020-07-28 PROCEDURE — 93010 ELECTROCARDIOGRAM REPORT: CPT

## 2020-07-28 PROCEDURE — G0463: CPT | Mod: 25

## 2020-07-28 PROCEDURE — 85610 PROTHROMBIN TIME: CPT

## 2020-07-28 PROCEDURE — 81001 URINALYSIS AUTO W/SCOPE: CPT

## 2020-07-28 PROCEDURE — 86850 RBC ANTIBODY SCREEN: CPT

## 2020-07-28 PROCEDURE — 86900 BLOOD TYPING SEROLOGIC ABO: CPT

## 2020-07-28 PROCEDURE — 85025 COMPLETE CBC W/AUTO DIFF WBC: CPT

## 2020-07-28 PROCEDURE — 86901 BLOOD TYPING SEROLOGIC RH(D): CPT

## 2020-07-28 PROCEDURE — 80048 BASIC METABOLIC PNL TOTAL CA: CPT

## 2020-07-28 PROCEDURE — 87086 URINE CULTURE/COLONY COUNT: CPT

## 2020-07-28 PROCEDURE — 36415 COLL VENOUS BLD VENIPUNCTURE: CPT

## 2020-07-28 PROCEDURE — 85730 THROMBOPLASTIN TIME PARTIAL: CPT

## 2020-07-28 RX ORDER — FLUTICASONE PROPIONATE AND SALMETEROL 50; 250 UG/1; UG/1
0 POWDER ORAL; RESPIRATORY (INHALATION)
Qty: 0 | Refills: 0 | DISCHARGE

## 2020-07-28 NOTE — H&P PST ADULT - TEACHING/LEARNING LEARNING PREFERENCES
written material/computer/internet/group instruction/pictorial/verbal instruction/video/skill demonstration/individual instruction/audio

## 2020-07-28 NOTE — H&P PST ADULT - HISTORY OF PRESENT ILLNESS
54 y.o WD,WN male presents for PST with hx of right hydrocele. Patient states back in October 2019 noticed right scrotal swelling. He states he had evaluated with urology and dx with right hydrocele. He was to have surgery in March but due to Covid pandemic had to postpone. He currently denies dysuria, hematuria, frequency or urgency. He is now scheduled for Right Hydrocelectomy

## 2020-07-28 NOTE — H&P PST ADULT - NSICDXPASTMEDICALHX_GEN_ALL_CORE_FT
PAST MEDICAL HISTORY:  Arthritis     Asthma controlled; never intubated    Disorder of Eustachian tube, bilateral     HTN (hypertension)     Hydrocele right    Neck pain related to arthritis

## 2020-07-28 NOTE — H&P PST ADULT - ASSESSMENT
54 y.o male scheduled 54 y.o male scheduled Right Hydrocelectomy   Plan  1. Stop all NSAIDS, herbal supplements and vitamins for 7 days.  2. NPO at midnight.  3. Take the following medications Amlodipine  with small sips of water on the morning of your procedure/surgery.  4. Labs, EKG,CXR  as per surgeon  5. PMD Dr Jass Gallego  visit for optimization prior to surgery as per surgeon  6. COVID swab appointment 8/1  Denies travel outside of state or country in the last 14 days.   Denies contact with known Coronavirus positive person.  Denies fever, chills, cough, congestion, runny nose, SOB or difficulty breathing, fatigue, muscle or body ache, headache. loss of taste or smell, N/V/D.

## 2020-07-29 DIAGNOSIS — N43.3 HYDROCELE, UNSPECIFIED: ICD-10-CM

## 2020-07-29 DIAGNOSIS — Z01.818 ENCOUNTER FOR OTHER PREPROCEDURAL EXAMINATION: ICD-10-CM

## 2020-07-29 LAB
CULTURE RESULTS: SIGNIFICANT CHANGE UP
SPECIMEN SOURCE: SIGNIFICANT CHANGE UP

## 2020-07-31 PROBLEM — M54.2 CERVICALGIA: Chronic | Status: ACTIVE | Noted: 2020-03-10

## 2020-07-31 PROBLEM — M19.90 UNSPECIFIED OSTEOARTHRITIS, UNSPECIFIED SITE: Chronic | Status: ACTIVE | Noted: 2020-07-28

## 2020-07-31 PROBLEM — I10 ESSENTIAL (PRIMARY) HYPERTENSION: Chronic | Status: ACTIVE | Noted: 2020-07-28

## 2020-08-01 ENCOUNTER — OUTPATIENT (OUTPATIENT)
Dept: OUTPATIENT SERVICES | Facility: HOSPITAL | Age: 55
LOS: 1 days | End: 2020-08-01
Payer: MEDICARE

## 2020-08-01 DIAGNOSIS — Z98.890 OTHER SPECIFIED POSTPROCEDURAL STATES: Chronic | ICD-10-CM

## 2020-08-01 DIAGNOSIS — Z11.59 ENCOUNTER FOR SCREENING FOR OTHER VIRAL DISEASES: ICD-10-CM

## 2020-08-01 PROCEDURE — U0003: CPT

## 2020-08-02 ENCOUNTER — APPOINTMENT (OUTPATIENT)
Dept: ULTRASOUND IMAGING | Facility: CLINIC | Age: 55
End: 2020-08-02
Payer: COMMERCIAL

## 2020-08-02 ENCOUNTER — OUTPATIENT (OUTPATIENT)
Dept: OUTPATIENT SERVICES | Facility: HOSPITAL | Age: 55
LOS: 1 days | End: 2020-08-02
Payer: COMMERCIAL

## 2020-08-02 DIAGNOSIS — Z00.00 ENCOUNTER FOR GENERAL ADULT MEDICAL EXAMINATION WITHOUT ABNORMAL FINDINGS: ICD-10-CM

## 2020-08-02 DIAGNOSIS — N43.3 HYDROCELE, UNSPECIFIED: ICD-10-CM

## 2020-08-02 DIAGNOSIS — Z11.59 ENCOUNTER FOR SCREENING FOR OTHER VIRAL DISEASES: ICD-10-CM

## 2020-08-02 DIAGNOSIS — Z98.890 OTHER SPECIFIED POSTPROCEDURAL STATES: Chronic | ICD-10-CM

## 2020-08-02 LAB — SARS-COV-2 RNA SPEC QL NAA+PROBE: SIGNIFICANT CHANGE UP

## 2020-08-02 PROCEDURE — 76870 US EXAM SCROTUM: CPT

## 2020-08-02 PROCEDURE — 76870 US EXAM SCROTUM: CPT | Mod: 26

## 2020-08-03 RX ORDER — FENTANYL CITRATE 50 UG/ML
50 INJECTION INTRAVENOUS
Refills: 0 | Status: DISCONTINUED | OUTPATIENT
Start: 2020-08-04 | End: 2020-08-04

## 2020-08-03 RX ORDER — SODIUM CHLORIDE 9 MG/ML
1000 INJECTION, SOLUTION INTRAVENOUS
Refills: 0 | Status: DISCONTINUED | OUTPATIENT
Start: 2020-08-04 | End: 2020-08-04

## 2020-08-03 RX ORDER — ONDANSETRON 8 MG/1
4 TABLET, FILM COATED ORAL ONCE
Refills: 0 | Status: DISCONTINUED | OUTPATIENT
Start: 2020-08-04 | End: 2020-08-04

## 2020-08-04 ENCOUNTER — RESULT REVIEW (OUTPATIENT)
Age: 55
End: 2020-08-04

## 2020-08-04 ENCOUNTER — APPOINTMENT (OUTPATIENT)
Dept: UROLOGY | Facility: HOSPITAL | Age: 55
End: 2020-08-04

## 2020-08-04 ENCOUNTER — OUTPATIENT (OUTPATIENT)
Dept: INPATIENT UNIT | Facility: HOSPITAL | Age: 55
LOS: 1 days | Discharge: HOME | End: 2020-08-04
Payer: COMMERCIAL

## 2020-08-04 VITALS
HEIGHT: 71 IN | OXYGEN SATURATION: 100 % | RESPIRATION RATE: 16 BRPM | TEMPERATURE: 98 F | WEIGHT: 188.94 LBS | DIASTOLIC BLOOD PRESSURE: 91 MMHG | SYSTOLIC BLOOD PRESSURE: 131 MMHG | HEART RATE: 86 BPM

## 2020-08-04 VITALS
HEART RATE: 71 BPM | OXYGEN SATURATION: 99 % | DIASTOLIC BLOOD PRESSURE: 80 MMHG | SYSTOLIC BLOOD PRESSURE: 119 MMHG | TEMPERATURE: 98 F | RESPIRATION RATE: 14 BRPM

## 2020-08-04 DIAGNOSIS — N43.3 HYDROCELE, UNSPECIFIED: ICD-10-CM

## 2020-08-04 DIAGNOSIS — Z98.890 OTHER SPECIFIED POSTPROCEDURAL STATES: Chronic | ICD-10-CM

## 2020-08-04 PROCEDURE — 88304 TISSUE EXAM BY PATHOLOGIST: CPT | Mod: 26

## 2020-08-04 PROCEDURE — 55040 REMOVAL OF HYDROCELE: CPT | Mod: RT

## 2020-08-04 PROCEDURE — 88302 TISSUE EXAM BY PATHOLOGIST: CPT

## 2020-08-04 PROCEDURE — 88302 TISSUE EXAM BY PATHOLOGIST: CPT | Mod: 26

## 2020-08-04 PROCEDURE — 88304 TISSUE EXAM BY PATHOLOGIST: CPT

## 2020-08-04 RX ORDER — ACETAMINOPHEN 500 MG
975 TABLET ORAL ONCE
Refills: 0 | Status: COMPLETED | OUTPATIENT
Start: 2020-08-04 | End: 2020-08-04

## 2020-08-04 RX ORDER — OXYCODONE HYDROCHLORIDE 5 MG/1
5 TABLET ORAL ONCE
Refills: 0 | Status: DISCONTINUED | OUTPATIENT
Start: 2020-08-04 | End: 2020-08-04

## 2020-08-04 RX ORDER — CEPHALEXIN 500 MG
1 CAPSULE ORAL
Qty: 6 | Refills: 0
Start: 2020-08-04 | End: 2020-08-06

## 2020-08-04 RX ORDER — IBUPROFEN 200 MG
1 TABLET ORAL
Qty: 15 | Refills: 0
Start: 2020-08-04 | End: 2020-08-08

## 2020-08-04 RX ORDER — FAMOTIDINE 10 MG/ML
20 INJECTION INTRAVENOUS ONCE
Refills: 0 | Status: COMPLETED | OUTPATIENT
Start: 2020-08-04 | End: 2020-08-04

## 2020-08-04 RX ORDER — OXYCODONE AND ACETAMINOPHEN 5; 325 MG/1; MG/1
1 TABLET ORAL
Qty: 16 | Refills: 0
Start: 2020-08-04 | End: 2020-08-07

## 2020-08-04 RX ADMIN — FENTANYL CITRATE 50 MICROGRAM(S): 50 INJECTION INTRAVENOUS at 11:06

## 2020-08-04 RX ADMIN — OXYCODONE HYDROCHLORIDE 5 MILLIGRAM(S): 5 TABLET ORAL at 11:05

## 2020-08-04 RX ADMIN — Medication 975 MILLIGRAM(S): at 07:51

## 2020-08-04 RX ADMIN — Medication 975 MILLIGRAM(S): at 07:50

## 2020-08-04 RX ADMIN — FAMOTIDINE 20 MILLIGRAM(S): 10 INJECTION INTRAVENOUS at 07:49

## 2020-08-04 NOTE — ASU PATIENT PROFILE, ADULT - TEACHING/LEARNING LEARNING PREFERENCES
skill demonstration/written material/computer/internet/group instruction/video/pictorial/verbal instruction/audio/individual instruction

## 2020-08-04 NOTE — ASU DISCHARGE PLAN (ADULT/PEDIATRIC) - ASU DC SPECIAL INSTRUCTIONSFT
"Subjective     Bailee Phoenix is a 30 y.o. female    Here as a new patient. Previous pt of Dr. Gibbs. Pt needs yearly and is trying to get pregnant. She stopped her pills in 2019.    Gynecologic Exam   The patient's pertinent negatives include no pelvic pain, vaginal bleeding or vaginal discharge. The patient is experiencing no pain. Pertinent negatives include no abdominal pain, anorexia, back pain, chills, constipation, diarrhea, discolored urine, dysuria, fever, flank pain, frequency, headaches, hematuria, joint pain, joint swelling, nausea, painful intercourse, rash, sore throat, urgency or vomiting. She is sexually active. She uses nothing for contraception. Her menstrual history has been regular.         /64   Ht 160 cm (63\")   Wt 75.3 kg (166 lb)   LMP 2020 (Approximate)   Breastfeeding No   BMI 29.41 kg/m²     Outpatient Encounter Medications as of 2020   Medication Sig Dispense Refill   • vitamin D (ERGOCALCIFEROL) 1.25 MG (38869 UT) capsule capsule TK 1 C PO WEEKLY     • [DISCONTINUED] ferrous sulfate 325 (65 FE) MG tablet Take 1 tablet by mouth 3 (Three) Times a Day With Meals. 90 tablet 3   • metoclopramide (REGLAN) 5 MG tablet Take 1 tablet by mouth 3 (Three) Times a Day As Needed (Nausea or vomiting). 10 tablet 0   • Prenat w/o A-FeCbGl-DSS-FA-DHA (PNV OB+DHA) 27-1 & 250 MG misc Take 1 tablet by mouth Daily. 30 each 12   • [DISCONTINUED] Prenatal Vit-Fe Fumarate-FA (PRENATAL, CLASSIC, VITAMIN) 28-0.8 MG tablet tablet Take  by mouth Daily.       No facility-administered encounter medications on file as of 2020.        Surgical History  Past Surgical History:   Procedure Laterality Date   • APPENDECTOMY     •  SECTION N/A 3/15/2018    Procedure:  SECTION PRIMARY;  Surgeon: Helen Cheng DO;  Location: [facility] LABOR DELIVERY;  Service: Obstetrics/Gynecology   • CHOLECYSTECTOMY     • DILATATION AND CURETTAGE     • KNEE ACL RECONSTRUCTION   "       Family History  Family History   Problem Relation Age of Onset   • Endometriosis Mother    • No Known Problems Brother    • Endometriosis Sister    • No Known Problems Brother    • No Known Problems Brother    • Breast cancer Neg Hx    • Ovarian cancer Neg Hx    • Uterine cancer Neg Hx    • Colon cancer Neg Hx    • Melanoma Neg Hx    • Prostate cancer Neg Hx        The following portions of the patient's history were reviewed and updated as appropriate: allergies, current medications, past family history, past medical history, past social history, past surgical history and problem list.    Review of Systems   Constitutional: Negative for activity change, appetite change, chills, diaphoresis, fatigue, fever, unexpected weight gain and unexpected weight loss.   HENT: Negative for congestion, dental problem, drooling, ear discharge, ear pain, facial swelling, hearing loss, mouth sores, nosebleeds, postnasal drip, rhinorrhea, sinus pressure, sneezing, sore throat, swollen glands, tinnitus, trouble swallowing and voice change.    Eyes: Negative for blurred vision, double vision, photophobia, pain, discharge, redness, itching and visual disturbance.   Respiratory: Negative for apnea, cough, choking, chest tightness, shortness of breath, wheezing and stridor.    Cardiovascular: Negative for chest pain, palpitations and leg swelling.   Gastrointestinal: Negative for abdominal distention, abdominal pain, anal bleeding, anorexia, blood in stool, constipation, diarrhea, nausea, rectal pain, vomiting, GERD and indigestion.   Endocrine: Negative for cold intolerance, heat intolerance, polydipsia, polyphagia and polyuria.   Genitourinary: Negative for amenorrhea, breast discharge, breast lump, breast pain, decreased libido, decreased urine volume, difficulty urinating, dyspareunia, dysuria, flank pain, frequency, genital sores, hematuria, menstrual problem, pelvic pain, pelvic pressure, urgency, urinary incontinence,  vaginal bleeding, vaginal discharge and vaginal pain.   Musculoskeletal: Negative for arthralgias, back pain, gait problem, joint pain, joint swelling, myalgias, neck pain, neck stiffness and bursitis.   Skin: Negative for color change, dry skin and rash.   Allergic/Immunologic: Negative for environmental allergies, food allergies and immunocompromised state.   Neurological: Negative for dizziness, tremors, seizures, syncope, facial asymmetry, speech difficulty, weakness, light-headedness, numbness, headache, memory problem and confusion.   Hematological: Negative for adenopathy. Does not bruise/bleed easily.   Psychiatric/Behavioral: Negative for agitation, behavioral problems, decreased concentration, dysphoric mood, hallucinations, self-injury, sleep disturbance, suicidal ideas, negative for hyperactivity, depressed mood and stress. The patient is not nervous/anxious.        Objective   Physical Exam   Constitutional: She is oriented to person, place, and time. She appears well-developed and well-nourished. No distress.   HENT:   Head: Normocephalic.   Right Ear: External ear normal.   Left Ear: External ear normal.   Nose: Nose normal.   Mouth/Throat: Oropharynx is clear and moist.   Eyes: Conjunctivae are normal. Right eye exhibits no discharge. Left eye exhibits no discharge. No scleral icterus.   Neck: Normal range of motion. Neck supple. Carotid bruit is not present. No tracheal deviation present. No thyromegaly present.   Cardiovascular: Normal rate, regular rhythm, normal heart sounds and intact distal pulses.   No murmur heard.  Pulmonary/Chest: Effort normal and breath sounds normal. No respiratory distress. She has no wheezes. Right breast exhibits no inverted nipple, no mass, no nipple discharge, no skin change and no tenderness. Left breast exhibits no inverted nipple, no mass, no nipple discharge, no skin change and no tenderness. No breast swelling, tenderness, discharge or bleeding. Breasts are  symmetrical.   Abdominal: Soft. She exhibits no distension and no mass. There is no tenderness. There is no guarding. No hernia. Hernia confirmed negative in the right inguinal area and confirmed negative in the left inguinal area.   Genitourinary: Rectum normal and uterus normal. Rectal exam shows no mass. No breast swelling, tenderness, discharge or bleeding. Pelvic exam was performed with patient supine. There is no rash, tenderness, lesion or injury on the right labia. There is no rash, tenderness, lesion or injury on the left labia. Uterus is not enlarged, not fixed and not tender. Cervix exhibits no motion tenderness, no discharge and no friability. Right adnexum displays no mass, no tenderness and no fullness. Left adnexum displays no mass, no tenderness and no fullness. No erythema, tenderness or bleeding in the vagina. No foreign body in the vagina. No signs of injury around the vagina. Vaginal discharge found.   Genitourinary Comments:   BSU normal  Urethral meatus  Normal  Perineum  Normal   Musculoskeletal: Normal range of motion. She exhibits no edema or tenderness.   Lymphadenopathy:        Head (right side): No submental, no submandibular, no tonsillar, no preauricular, no posterior auricular and no occipital adenopathy present.        Head (left side): No submental, no submandibular, no tonsillar, no preauricular, no posterior auricular and no occipital adenopathy present.     She has no cervical adenopathy.        Right cervical: No superficial cervical, no deep cervical and no posterior cervical adenopathy present.       Left cervical: No superficial cervical, no deep cervical and no posterior cervical adenopathy present.     She has no axillary adenopathy.        Right: No inguinal adenopathy present.        Left: No inguinal adenopathy present.   Neurological: She is alert and oriented to person, place, and time. Coordination normal.   Skin: Skin is warm and dry. No bruising and no rash noted.  She is not diaphoretic. No erythema.   Psychiatric: She has a normal mood and affect. Her behavior is normal. Judgment and thought content normal.   Nursing note and vitals reviewed.      Assessment/Plan   Bailee was seen today for gynecologic exam.    Diagnoses and all orders for this visit:    Well woman exam with routine gynecological exam  Normal GYN exam. Will have lab work at PCP. Encouraged SBE, pt is aware how to do self breast exam and the importance of same. Discussed weight management and importance of maintaining a healthy weight. Discussed Vitamin D intake and the importance of adequate vitamin D for both Bone Health and a healthy immune system.  Discussed Daily exercise and the importance of same, in regards to a healthy heart as well as helping to maintain her weight and improving her mental health.  BMI 29.4. Pap smear is done per ASCCP guidelines.  -     Liquid-based Pap Smear, Screening    Pre-conception counseling  Folic acid for the prevention of neural tube defects was discussed.  At least 0.4 mg should be taken preconceptionally to reduce the risk.  It was explained that this may reduce the baseline incidence of neural tube defect by as much as 65%.  Folic acid can be found in OTC multivitamins, OTC prenatal vitamins or breakfast cereal that that contains 100% of the RDA of folate. She is encouraged to stop drinking alcohol and to stop smoking if she smokes. Encouraged to eat a healthy diet.   -     Prenat w/o A-FeCbGl-DSS-FA-DHA (PNV OB+DHA) 27-1 & 250 MG misc; Take 1 tablet by mouth Daily.    Vaginal discharge  Comments:  Patient has a moderate amount of white discharge.  BV panel is added to her Pap smear.  No medication is given today.  Orders:  -     Liquid-based Pap Smear, Screening         Patient's Body mass index is 29.41 kg/m². BMI is above normal parameters. Recommendations include: educational material, exercise counseling and nutrition counseling.      Viviane Layne, APRN  7/2/2020   Follow up on 8/19/20 at 10.50a.   [address] Floor  [address] [place] [address]     Change dressing as needed.   Bacitracin ointment to incision twice a day for next 1 week.   Continue to wear scrotal support.   Ice pack 10 minutes on and 10 minutes off as needed.

## 2020-08-04 NOTE — ASU DISCHARGE PLAN (ADULT/PEDIATRIC) - CALL YOUR DOCTOR IF YOU HAVE ANY OF THE FOLLOWING:
Nausea and vomiting that does not stop/Increased irritability or sluggishness/Inability to tolerate liquids or foods/Pain not relieved by Medications/Fever greater than (need to indicate Fahrenheit or Celsius)/Bleeding that does not stop/Swelling that gets worse

## 2020-08-04 NOTE — ASU PATIENT PROFILE, ADULT - PMH
Arthritis    Asthma  controlled; never intubated  Disorder of Eustachian tube, bilateral    HTN (hypertension)    Hydrocele  right  Neck pain  related to arthritis

## 2020-08-10 DIAGNOSIS — Z79.51 LONG TERM (CURRENT) USE OF INHALED STEROIDS: ICD-10-CM

## 2020-08-10 DIAGNOSIS — Z20.828 CONTACT WITH AND (SUSPECTED) EXPOSURE TO OTHER VIRAL COMMUNICABLE DISEASES: ICD-10-CM

## 2020-08-10 DIAGNOSIS — Z88.2 ALLERGY STATUS TO SULFONAMIDES: ICD-10-CM

## 2020-08-10 DIAGNOSIS — Z01.812 ENCOUNTER FOR PREPROCEDURAL LABORATORY EXAMINATION: ICD-10-CM

## 2020-08-10 DIAGNOSIS — I10 ESSENTIAL (PRIMARY) HYPERTENSION: ICD-10-CM

## 2020-08-10 DIAGNOSIS — M19.90 UNSPECIFIED OSTEOARTHRITIS, UNSPECIFIED SITE: ICD-10-CM

## 2020-08-10 DIAGNOSIS — N43.3 HYDROCELE, UNSPECIFIED: ICD-10-CM

## 2020-08-10 DIAGNOSIS — J45.909 UNSPECIFIED ASTHMA, UNCOMPLICATED: ICD-10-CM

## 2020-08-12 ENCOUNTER — APPOINTMENT (OUTPATIENT)
Dept: UROLOGY | Facility: CLINIC | Age: 55
End: 2020-08-12

## 2020-08-19 ENCOUNTER — APPOINTMENT (OUTPATIENT)
Dept: UROLOGY | Facility: CLINIC | Age: 55
End: 2020-08-19
Payer: COMMERCIAL

## 2020-08-19 VITALS
BODY MASS INDEX: 26.6 KG/M2 | HEART RATE: 79 BPM | WEIGHT: 190 LBS | TEMPERATURE: 97.1 F | HEIGHT: 71 IN | SYSTOLIC BLOOD PRESSURE: 130 MMHG | OXYGEN SATURATION: 99 % | DIASTOLIC BLOOD PRESSURE: 89 MMHG

## 2020-08-19 PROCEDURE — 99024 POSTOP FOLLOW-UP VISIT: CPT

## 2020-08-27 NOTE — ASSESSMENT
[FreeTextEntry1] : Hydrocele:\par \par Discussed pathology. \par Incision healing well. \par \par \par Return to office in 6 weeks or sooner if any issues.

## 2020-08-27 NOTE — HISTORY OF PRESENT ILLNESS
[FreeTextEntry1] : 53 yo male presents for follow up. \par Status post right Hydrocelectomy done on 8/4/20 at [facility]. \par Doing well. \par Denies dysuria, fever, chills or rigors.

## 2020-08-27 NOTE — PHYSICAL EXAM
[FreeTextEntry1] : Scrotal incision: healing well, no erythema or discharge, slight induration around right testis  [] : no respiratory distress [Oriented To Time, Place, And Person] : oriented to person, place, and time

## 2020-09-30 ENCOUNTER — APPOINTMENT (OUTPATIENT)
Dept: UROLOGY | Facility: CLINIC | Age: 55
End: 2020-09-30
Payer: COMMERCIAL

## 2020-09-30 DIAGNOSIS — N43.3 HYDROCELE, UNSPECIFIED: ICD-10-CM

## 2020-09-30 PROCEDURE — 99024 POSTOP FOLLOW-UP VISIT: CPT

## 2020-09-30 NOTE — HISTORY OF PRESENT ILLNESS
[FreeTextEntry1] : 55 yo male presents for follow up. \par Status post right Hydrocelectomy done on 8/4/20 at [facility]. \par Doing well. Wound healed well. \par

## 2020-09-30 NOTE — PHYSICAL EXAM
[Normal Appearance] : normal appearance [General Appearance - In No Acute Distress] : no acute distress [FreeTextEntry1] : Scrotal incision: healed well, bilateral testis normal to palpate [] : no respiratory distress [Oriented To Time, Place, And Person] : oriented to person, place, and time

## 2022-02-15 NOTE — H&P PST ADULT - DOES THIS PATIENT HAVE A HISTORY OF OR HAS BEEN DX WITH HEART FAILURE?
Called and spoke to Fabricio     Made him an appt for 3/16 @ 8 am with Dr. Coy for Vitreo Yag    Mailed out a new pt packet    Notes were faxed to us     Updated insurance, but Fabricio will bring in his medicare care day of appt as he did not have it with him when I updated insurance.     Per Fabricio     About four years ago he started having floaters.     He had cataract surgery in [place] and a laser for his floaters     He wanted another laser treatment done.     Altagracia Townsend Communication Facilitator on 2/15/2022 at 2:06 PM        
no

## 2022-08-01 ENCOUNTER — NON-APPOINTMENT (OUTPATIENT)
Age: 57
End: 2022-08-01

## 2022-09-07 NOTE — H&P PST ADULT - NS PRO TALK SOMEONE YN
From: Paul Patricio  To: Maikel Morocho  Sent: 9/6/2022 9:03 PM CDT  Subject: Pain During Erection    Carina Morocho,    Over the past few months I've begun to feel some pain whenever I have an erection. I don't experience pain during urination or any other time.    My wife and I have not had sex in about 5-6 months because she is pregnant. I have solely been with her for the last 10 years.     I don't know if this is a common occurrence or not when going on sexual hiatus or if this is a symptom of blood clots so I thought I'd reach out.    Thank you,  Paul   no

## 2022-09-20 ENCOUNTER — APPOINTMENT (OUTPATIENT)
Dept: ORTHOPEDIC SURGERY | Facility: CLINIC | Age: 57
End: 2022-09-20

## 2022-09-20 VITALS — WEIGHT: 190 LBS | BODY MASS INDEX: 26.6 KG/M2 | HEIGHT: 71 IN

## 2022-09-20 DIAGNOSIS — M79.18 MYALGIA, OTHER SITE: ICD-10-CM

## 2022-09-20 PROCEDURE — 99204 OFFICE O/P NEW MOD 45 MIN: CPT

## 2022-09-20 PROCEDURE — 73502 X-RAY EXAM HIP UNI 2-3 VIEWS: CPT | Mod: RT

## 2022-09-20 PROCEDURE — 99072 ADDL SUPL MATRL&STAF TM PHE: CPT

## 2022-09-20 NOTE — WORK
[Total] : total [Does not reveal pre-existing condition(s) that may affect treatment/prognosis] : does not reveal pre-existing condition(s) that may affect treatment/prognosis [Cannot return to work because ________] : cannot return to work because [unfilled] [Patient] : patient [I provided the services listed above] :  I provided the services listed above.

## 2022-09-20 NOTE — HISTORY OF PRESENT ILLNESS
[de-identified] : WC 9/19/22\par 56 year old male  (RHD,   ) right proximal hamstring pain since 9/19/22. Was at work coming down from a ladder when he landed weird on his leg and felt a pop. \par The pain is located over proximal hamstring and radiates down the leg\par Worse with activity, sitting and better at rest.\par Has tried ibuprofen\par

## 2022-09-20 NOTE — IMAGING
[de-identified] : \par RIGHT HIP and THIGH\par Inspection:  hamstring swelling, ecchymosis posterior thigh\par Palpation: hamstring tenderness but no bowstringing or gapping\par Range of Motion:  full internal rotation, full external rotation, pain with extension\par Strength: 5/5 Flexion, 4-/5 Extension, 5//5 Abduction, 5/5/ Adduction \par Neurological: light touch is intact throughout\par Special Tests: \par Impingement: neg\par Groin pain with IR: neg\par Vanna: neg\par Pain in posterior thigh with leg lift and knee bent: positive\par \par  [Right] : right hip with pelvis [There are no fractures, subluxations or dislocations. No significant abnormalities are seen] : There are no fractures, subluxations or dislocations. No significant abnormalities are seen

## 2022-09-20 NOTE — ASSESSMENT
[FreeTextEntry1] : xray no avulsion seen\par \par right hamstring tear\par \par \par - The patient was advised of the diagnosis.  The natural history of the pathology was explained to the patient in layman's terms.  Several different treatment options were discussed and explained including the risks and benefits of both surgical and non-surgical treatments.  All questions and concerns were answered.\par - mri to eval tearing\par - The patient was advised to modify their activities.\par - MDP\par - Discussed the possible side effects of medication along with the timing and frequency for taking.\par - oow\par \par

## 2022-09-21 ENCOUNTER — FORM ENCOUNTER (OUTPATIENT)
Age: 57
End: 2022-09-21

## 2022-09-22 ENCOUNTER — APPOINTMENT (OUTPATIENT)
Dept: MRI IMAGING | Facility: CLINIC | Age: 57
End: 2022-09-22

## 2022-09-22 PROCEDURE — 99072 ADDL SUPL MATRL&STAF TM PHE: CPT

## 2022-09-22 PROCEDURE — 73718 MRI LOWER EXTREMITY W/O DYE: CPT | Mod: RT

## 2022-09-27 ENCOUNTER — APPOINTMENT (OUTPATIENT)
Dept: ORTHOPEDIC SURGERY | Facility: CLINIC | Age: 57
End: 2022-09-27

## 2022-09-27 PROCEDURE — 99214 OFFICE O/P EST MOD 30 MIN: CPT

## 2022-09-27 PROCEDURE — 99072 ADDL SUPL MATRL&STAF TM PHE: CPT

## 2022-09-27 NOTE — IMAGING
[de-identified] : \par RIGHT HIP and THIGH\par Inspection:  hamstring swelling, ecchymosis posterior thigh\par Palpation: hamstring tenderness but no bowstringing or gapping\par Range of Motion:  full internal rotation, full external rotation, pain with extension\par Strength: 5/5 Flexion, 4-/5 Extension, 5//5 Abduction, 5/5/ Adduction \par Neurological: light touch is intact throughout\par Special Tests: \par Impingement: neg\par Groin pain with IR: neg\par Vanna: neg\par Pain in posterior thigh with leg lift and knee bent: positive\par \par

## 2022-09-27 NOTE — ASSESSMENT
[FreeTextEntry1] : mri right femur 9/22/22 - prox hamstring tear with 1cm retraction, st edema\par \par \par - The patient was advised of the diagnosis.  The natural history of the pathology was explained to the patient in layman's terms.  Several different treatment options were discussed and explained including the risks and benefits of both surgical and non-surgical treatments.  All questions and concerns were answered.\par - We will continue conservative treatment with PT, icing, and anti-inflammatory medications.\par - The patient was advised to modify their activities.\par - The patient was advised to apply ice (wrapped in a towel or protective covering) to the area daily (20 minutes at a time, 2-4X/day).\par - fu 6 week re-eval and consider advance activtiy\par - oow\par \par

## 2022-09-27 NOTE — HISTORY OF PRESENT ILLNESS
[de-identified] : WC 9/19/22\par 56 year old male  (RHD,   ) right proximal hamstring pain since 9/19/22. Was at work coming down from a ladder when he landed weird on his leg and felt a pop. \par The pain is located over proximal hamstring and radiates down the leg\par Worse with activity, sitting and better at rest.\par Has tried ibuprofen\par \par 9/27/22 - sent MDP, had been oow, mod activity, had mri showing hamsteirng tear

## 2022-11-15 ENCOUNTER — APPOINTMENT (OUTPATIENT)
Dept: ORTHOPEDIC SURGERY | Facility: CLINIC | Age: 57
End: 2022-11-15

## 2022-11-15 DIAGNOSIS — S76.311A STRAIN OF MUSCLE, FASCIA AND TENDON OF THE POSTERIOR MUSCLE GROUP AT THIGH LEVEL, RIGHT THIGH, INITIAL ENCOUNTER: ICD-10-CM

## 2022-11-15 PROCEDURE — 99213 OFFICE O/P EST LOW 20 MIN: CPT

## 2022-11-15 PROCEDURE — 99072 ADDL SUPL MATRL&STAF TM PHE: CPT

## 2022-11-15 NOTE — WORK
[Does not reveal pre-existing condition(s) that may affect treatment/prognosis] : does not reveal pre-existing condition(s) that may affect treatment/prognosis [Patient] : patient [I provided the services listed above] :  I provided the services listed above. [Partial] : partial [Can return to work without limitations on ______] : can return to work without limitations on [unfilled]

## 2022-11-15 NOTE — IMAGING
[de-identified] : \par RIGHT HIP and THIGH\par Inspection:  no swelling\par Palpation: hamstring tenderness but no bowstringing or gapping\par Range of Motion:  full internal rotation, full external rotation, pain with extension\par Strength: 5/5 Flexion, 5-/5 Extension, 5//5 Abduction, 5/5/ Adduction \par Neurological: light touch is intact throughout\par Special Tests: \par Impingement: neg\par Groin pain with IR: neg\par Vanna: neg\par Pain in posterior thigh with leg lift and knee bent: positive\par \par

## 2022-11-15 NOTE — HISTORY OF PRESENT ILLNESS
[de-identified] : WC 9/19/22\par 56 year old male  (RHD,   ) right proximal hamstring pain since 9/19/22. Was at work coming down from a ladder when he landed weird on his leg and felt a pop. \par The pain is located over proximal hamstring and radiates down the leg\par Worse with activity, sitting and better at rest.\par Has tried ibuprofen\par \par 9/27/22 - sent MDP, had been oow, mod activity, had mri showing hamsteirng tear\par 11/15/22- R hamstring improved with some limitations with ADL activity, going to PT, HEP, Advil PRN

## 2022-11-15 NOTE — ASSESSMENT
[FreeTextEntry1] : mri right femur 9/22/22 - prox hamstring tear with 1cm retraction, st edema\par \par \par - The patient was advised of the diagnosis.  The natural history of the pathology was explained to the patient in layman's terms.  Several different treatment options were discussed and explained including the risks and benefits of both surgical and non-surgical treatments.  All questions and concerns were answered.\par - We will continue conservative treatment with PT, icing, and anti-inflammatory medications.\par - The patient was advised to modify their activities.\par - The patient was advised to apply ice (wrapped in a towel or protective covering) to the area daily (20 minutes at a time, 2-4X/day).\par - The patient was advised to let pain guide the gradual advancement of activities.\par - can rtw 11/28\par

## 2023-06-19 ENCOUNTER — INPATIENT (INPATIENT)
Facility: HOSPITAL | Age: 58
LOS: 1 days | Discharge: HOME | DRG: 176 | End: 2023-06-21
Attending: SURGERY | Admitting: SURGERY
Payer: COMMERCIAL

## 2023-06-19 VITALS — WEIGHT: 175.05 LBS | HEIGHT: 70 IN

## 2023-06-19 DIAGNOSIS — R09.89 OTHER SPECIFIED SYMPTOMS AND SIGNS INVOLVING THE CIRCULATORY AND RESPIRATORY SYSTEMS: ICD-10-CM

## 2023-06-19 DIAGNOSIS — Z98.890 OTHER SPECIFIED POSTPROCEDURAL STATES: Chronic | ICD-10-CM

## 2023-06-19 DIAGNOSIS — I26.99 OTHER PULMONARY EMBOLISM WITHOUT ACUTE COR PULMONALE: ICD-10-CM

## 2023-06-19 LAB
A1C WITH ESTIMATED AVERAGE GLUCOSE RESULT: 5.8 % — HIGH (ref 4–5.6)
ADD ON TEST-SPECIMEN IN LAB: SIGNIFICANT CHANGE UP
ADD ON TEST-SPECIMEN IN LAB: SIGNIFICANT CHANGE UP
ALBUMIN SERPL ELPH-MCNC: 4 G/DL — SIGNIFICANT CHANGE UP (ref 3.3–5)
ALP SERPL-CCNC: 105 U/L — SIGNIFICANT CHANGE UP (ref 40–120)
ALT FLD-CCNC: 208 U/L — HIGH (ref 12–78)
ANION GAP SERPL CALC-SCNC: 9 MMOL/L — SIGNIFICANT CHANGE UP (ref 5–17)
APTT BLD: 124 SEC — CRITICAL HIGH (ref 27.5–35.5)
APTT BLD: 28.5 SEC — SIGNIFICANT CHANGE UP (ref 27.5–35.5)
APTT BLD: 98.3 SEC — HIGH (ref 27.5–35.5)
AST SERPL-CCNC: 204 U/L — HIGH (ref 15–37)
BASOPHILS # BLD AUTO: 0.05 K/UL — SIGNIFICANT CHANGE UP (ref 0–0.2)
BASOPHILS NFR BLD AUTO: 0.6 % — SIGNIFICANT CHANGE UP (ref 0–2)
BILIRUB SERPL-MCNC: 0.3 MG/DL — SIGNIFICANT CHANGE UP (ref 0.2–1.2)
BLD GP AB SCN SERPL QL: SIGNIFICANT CHANGE UP
BUN SERPL-MCNC: 16 MG/DL — SIGNIFICANT CHANGE UP (ref 7–23)
CALCIUM SERPL-MCNC: 9 MG/DL — SIGNIFICANT CHANGE UP (ref 8.5–10.1)
CHLORIDE SERPL-SCNC: 108 MMOL/L — SIGNIFICANT CHANGE UP (ref 96–108)
CK SERPL-CCNC: 256 U/L — SIGNIFICANT CHANGE UP (ref 26–308)
CO2 SERPL-SCNC: 24 MMOL/L — SIGNIFICANT CHANGE UP (ref 22–31)
CREAT SERPL-MCNC: 1.36 MG/DL — HIGH (ref 0.5–1.3)
D DIMER BLD IA.RAPID-MCNC: 2493 NG/ML DDU — HIGH
EGFR: 61 ML/MIN/1.73M2 — SIGNIFICANT CHANGE UP
EOSINOPHIL # BLD AUTO: 0.16 K/UL — SIGNIFICANT CHANGE UP (ref 0–0.5)
EOSINOPHIL NFR BLD AUTO: 2.1 % — SIGNIFICANT CHANGE UP (ref 0–6)
ESTIMATED AVERAGE GLUCOSE: 120 MG/DL — HIGH (ref 68–114)
GLUCOSE SERPL-MCNC: 193 MG/DL — HIGH (ref 70–99)
HCT VFR BLD CALC: 38.5 % — LOW (ref 39–50)
HCT VFR BLD CALC: 43.2 % — SIGNIFICANT CHANGE UP (ref 39–50)
HGB BLD-MCNC: 13 G/DL — SIGNIFICANT CHANGE UP (ref 13–17)
HGB BLD-MCNC: 14.3 G/DL — SIGNIFICANT CHANGE UP (ref 13–17)
IMM GRANULOCYTES NFR BLD AUTO: 0.4 % — SIGNIFICANT CHANGE UP (ref 0–0.9)
INR BLD: 0.9 RATIO — SIGNIFICANT CHANGE UP (ref 0.88–1.16)
LACTATE SERPL-SCNC: 2.2 MMOL/L — HIGH (ref 0.7–2)
LACTATE SERPL-SCNC: 3.2 MMOL/L — HIGH (ref 0.7–2)
LIDOCAIN IGE QN: 185 U/L — SIGNIFICANT CHANGE UP (ref 73–393)
LYMPHOCYTES # BLD AUTO: 1.25 K/UL — SIGNIFICANT CHANGE UP (ref 1–3.3)
LYMPHOCYTES # BLD AUTO: 16.1 % — SIGNIFICANT CHANGE UP (ref 13–44)
MAGNESIUM SERPL-MCNC: 2.3 MG/DL — SIGNIFICANT CHANGE UP (ref 1.6–2.6)
MCHC RBC-ENTMCNC: 33.1 GM/DL — SIGNIFICANT CHANGE UP (ref 32–36)
MCHC RBC-ENTMCNC: 33.8 GM/DL — SIGNIFICANT CHANGE UP (ref 32–36)
MCHC RBC-ENTMCNC: 34.4 PG — HIGH (ref 27–34)
MCHC RBC-ENTMCNC: 34.7 PG — HIGH (ref 27–34)
MCV RBC AUTO: 101.9 FL — HIGH (ref 80–100)
MCV RBC AUTO: 104.9 FL — HIGH (ref 80–100)
MONOCYTES # BLD AUTO: 0.45 K/UL — SIGNIFICANT CHANGE UP (ref 0–0.9)
MONOCYTES NFR BLD AUTO: 5.8 % — SIGNIFICANT CHANGE UP (ref 2–14)
NEUTROPHILS # BLD AUTO: 5.8 K/UL — SIGNIFICANT CHANGE UP (ref 1.8–7.4)
NEUTROPHILS NFR BLD AUTO: 75 % — SIGNIFICANT CHANGE UP (ref 43–77)
NT-PROBNP SERPL-SCNC: 15 PG/ML — SIGNIFICANT CHANGE UP (ref 0–125)
PLATELET # BLD AUTO: 181 K/UL — SIGNIFICANT CHANGE UP (ref 150–400)
PLATELET # BLD AUTO: 182 K/UL — SIGNIFICANT CHANGE UP (ref 150–400)
POTASSIUM SERPL-MCNC: 3.9 MMOL/L — SIGNIFICANT CHANGE UP (ref 3.5–5.3)
POTASSIUM SERPL-SCNC: 3.9 MMOL/L — SIGNIFICANT CHANGE UP (ref 3.5–5.3)
PROT SERPL-MCNC: 7.5 GM/DL — SIGNIFICANT CHANGE UP (ref 6–8.3)
PROTHROM AB SERPL-ACNC: 10.4 SEC — LOW (ref 10.5–13.4)
RBC # BLD: 3.78 M/UL — LOW (ref 4.2–5.8)
RBC # BLD: 4.12 M/UL — LOW (ref 4.2–5.8)
RBC # FLD: 13.4 % — SIGNIFICANT CHANGE UP (ref 10.3–14.5)
RBC # FLD: 13.6 % — SIGNIFICANT CHANGE UP (ref 10.3–14.5)
SODIUM SERPL-SCNC: 141 MMOL/L — SIGNIFICANT CHANGE UP (ref 135–145)
TROPONIN I, HIGH SENSITIVITY RESULT: 1091.74 NG/L — HIGH
TROPONIN I, HIGH SENSITIVITY RESULT: 1583.88 NG/L — HIGH
TROPONIN I, HIGH SENSITIVITY RESULT: 229.51 NG/L — HIGH
TSH SERPL-MCNC: 5.7 UU/ML — HIGH (ref 0.34–4.82)
WBC # BLD: 7.74 K/UL — SIGNIFICANT CHANGE UP (ref 3.8–10.5)
WBC # BLD: 8.36 K/UL — SIGNIFICANT CHANGE UP (ref 3.8–10.5)
WBC # FLD AUTO: 7.74 K/UL — SIGNIFICANT CHANGE UP (ref 3.8–10.5)
WBC # FLD AUTO: 8.36 K/UL — SIGNIFICANT CHANGE UP (ref 3.8–10.5)

## 2023-06-19 PROCEDURE — 83090 ASSAY OF HOMOCYSTEINE: CPT

## 2023-06-19 PROCEDURE — 85730 THROMBOPLASTIN TIME PARTIAL: CPT

## 2023-06-19 PROCEDURE — 86147 CARDIOLIPIN ANTIBODY EA IG: CPT

## 2023-06-19 PROCEDURE — 99223 1ST HOSP IP/OBS HIGH 75: CPT

## 2023-06-19 PROCEDURE — 36415 COLL VENOUS BLD VENIPUNCTURE: CPT

## 2023-06-19 PROCEDURE — 85025 COMPLETE CBC W/AUTO DIFF WBC: CPT

## 2023-06-19 PROCEDURE — 85732 THROMBOPLASTIN TIME PARTIAL: CPT

## 2023-06-19 PROCEDURE — 84484 ASSAY OF TROPONIN QUANT: CPT

## 2023-06-19 PROCEDURE — 83036 HEMOGLOBIN GLYCOSYLATED A1C: CPT

## 2023-06-19 PROCEDURE — 93970 EXTREMITY STUDY: CPT | Mod: 26

## 2023-06-19 PROCEDURE — 93970 EXTREMITY STUDY: CPT

## 2023-06-19 PROCEDURE — 80048 BASIC METABOLIC PNL TOTAL CA: CPT

## 2023-06-19 PROCEDURE — 80053 COMPREHEN METABOLIC PANEL: CPT

## 2023-06-19 PROCEDURE — 76700 US EXAM ABDOM COMPLETE: CPT | Mod: 26

## 2023-06-19 PROCEDURE — 84100 ASSAY OF PHOSPHORUS: CPT

## 2023-06-19 PROCEDURE — 99291 CRITICAL CARE FIRST HOUR: CPT

## 2023-06-19 PROCEDURE — 70450 CT HEAD/BRAIN W/O DYE: CPT | Mod: 26,MA

## 2023-06-19 PROCEDURE — 93005 ELECTROCARDIOGRAM TRACING: CPT

## 2023-06-19 PROCEDURE — 76700 US EXAM ABDOM COMPLETE: CPT

## 2023-06-19 PROCEDURE — 85027 COMPLETE CBC AUTOMATED: CPT

## 2023-06-19 PROCEDURE — 93306 TTE W/DOPPLER COMPLETE: CPT | Mod: 26

## 2023-06-19 PROCEDURE — 94640 AIRWAY INHALATION TREATMENT: CPT

## 2023-06-19 PROCEDURE — 71275 CT ANGIOGRAPHY CHEST: CPT | Mod: 26,MA

## 2023-06-19 PROCEDURE — 86146 BETA-2 GLYCOPROTEIN ANTIBODY: CPT

## 2023-06-19 PROCEDURE — 83735 ASSAY OF MAGNESIUM: CPT

## 2023-06-19 PROCEDURE — 71045 X-RAY EXAM CHEST 1 VIEW: CPT | Mod: 26

## 2023-06-19 PROCEDURE — 83605 ASSAY OF LACTIC ACID: CPT

## 2023-06-19 PROCEDURE — 93010 ELECTROCARDIOGRAM REPORT: CPT

## 2023-06-19 PROCEDURE — 82746 ASSAY OF FOLIC ACID SERUM: CPT

## 2023-06-19 PROCEDURE — 82607 VITAMIN B-12: CPT

## 2023-06-19 PROCEDURE — 85613 RUSSELL VIPER VENOM DILUTED: CPT

## 2023-06-19 RX ORDER — PSEUDOEPHEDRINE HCL 30 MG
0 TABLET ORAL
Qty: 0 | Refills: 0 | DISCHARGE

## 2023-06-19 RX ORDER — BUDESONIDE AND FORMOTEROL FUMARATE DIHYDRATE 160; 4.5 UG/1; UG/1
2 AEROSOL RESPIRATORY (INHALATION)
Refills: 0 | Status: DISCONTINUED | OUTPATIENT
Start: 2023-06-19 | End: 2023-06-21

## 2023-06-19 RX ORDER — HEPARIN SODIUM 5000 [USP'U]/ML
3500 INJECTION INTRAVENOUS; SUBCUTANEOUS EVERY 6 HOURS
Refills: 0 | Status: DISCONTINUED | OUTPATIENT
Start: 2023-06-19 | End: 2023-06-21

## 2023-06-19 RX ORDER — HYDROMORPHONE HYDROCHLORIDE 2 MG/ML
1 INJECTION INTRAMUSCULAR; INTRAVENOUS; SUBCUTANEOUS ONCE
Refills: 0 | Status: DISCONTINUED | OUTPATIENT
Start: 2023-06-19 | End: 2023-06-19

## 2023-06-19 RX ORDER — ONDANSETRON 8 MG/1
4 TABLET, FILM COATED ORAL ONCE
Refills: 0 | Status: COMPLETED | OUTPATIENT
Start: 2023-06-19 | End: 2023-06-19

## 2023-06-19 RX ORDER — HEPARIN SODIUM 5000 [USP'U]/ML
INJECTION INTRAVENOUS; SUBCUTANEOUS
Qty: 25000 | Refills: 0 | Status: DISCONTINUED | OUTPATIENT
Start: 2023-06-19 | End: 2023-06-21

## 2023-06-19 RX ORDER — SODIUM CHLORIDE 9 MG/ML
1000 INJECTION, SOLUTION INTRAVENOUS
Refills: 0 | Status: DISCONTINUED | OUTPATIENT
Start: 2023-06-19 | End: 2023-06-20

## 2023-06-19 RX ORDER — HEPARIN SODIUM 5000 [USP'U]/ML
7000 INJECTION INTRAVENOUS; SUBCUTANEOUS EVERY 6 HOURS
Refills: 0 | Status: DISCONTINUED | OUTPATIENT
Start: 2023-06-19 | End: 2023-06-21

## 2023-06-19 RX ORDER — FLUTICASONE PROPIONATE AND SALMETEROL 50; 250 UG/1; UG/1
1 POWDER ORAL; RESPIRATORY (INHALATION)
Refills: 0 | DISCHARGE

## 2023-06-19 RX ORDER — HEPARIN SODIUM 5000 [USP'U]/ML
7000 INJECTION INTRAVENOUS; SUBCUTANEOUS ONCE
Refills: 0 | Status: COMPLETED | OUTPATIENT
Start: 2023-06-19 | End: 2023-06-19

## 2023-06-19 RX ORDER — ALBUTEROL 90 UG/1
2.5 AEROSOL, METERED ORAL EVERY 6 HOURS
Refills: 0 | Status: DISCONTINUED | OUTPATIENT
Start: 2023-06-19 | End: 2023-06-19

## 2023-06-19 RX ORDER — ATORVASTATIN CALCIUM 80 MG/1
1 TABLET, FILM COATED ORAL
Refills: 0 | DISCHARGE

## 2023-06-19 RX ORDER — ACETAMINOPHEN 500 MG
1000 TABLET ORAL ONCE
Refills: 0 | Status: COMPLETED | OUTPATIENT
Start: 2023-06-19 | End: 2023-06-19

## 2023-06-19 RX ORDER — ASPIRIN/CALCIUM CARB/MAGNESIUM 324 MG
324 TABLET ORAL ONCE
Refills: 0 | Status: COMPLETED | OUTPATIENT
Start: 2023-06-19 | End: 2023-06-19

## 2023-06-19 RX ORDER — AMLODIPINE BESYLATE 2.5 MG/1
1 TABLET ORAL
Qty: 0 | Refills: 0 | DISCHARGE

## 2023-06-19 RX ORDER — HYDROMORPHONE HYDROCHLORIDE 2 MG/ML
1 INJECTION INTRAMUSCULAR; INTRAVENOUS; SUBCUTANEOUS EVERY 6 HOURS
Refills: 0 | Status: DISCONTINUED | OUTPATIENT
Start: 2023-06-19 | End: 2023-06-19

## 2023-06-19 RX ORDER — FLUTICASONE PROPIONATE AND SALMETEROL 50; 250 UG/1; UG/1
1 POWDER ORAL; RESPIRATORY (INHALATION)
Qty: 0 | Refills: 0 | DISCHARGE

## 2023-06-19 RX ORDER — SODIUM CHLORIDE 9 MG/ML
1000 INJECTION INTRAMUSCULAR; INTRAVENOUS; SUBCUTANEOUS ONCE
Refills: 0 | Status: COMPLETED | OUTPATIENT
Start: 2023-06-19 | End: 2023-06-19

## 2023-06-19 RX ORDER — ALBUTEROL 90 UG/1
2.5 AEROSOL, METERED ORAL EVERY 6 HOURS
Refills: 0 | Status: DISCONTINUED | OUTPATIENT
Start: 2023-06-19 | End: 2023-06-21

## 2023-06-19 RX ORDER — LANOLIN ALCOHOL/MO/W.PET/CERES
5 CREAM (GRAM) TOPICAL AT BEDTIME
Refills: 0 | Status: DISCONTINUED | OUTPATIENT
Start: 2023-06-19 | End: 2023-06-21

## 2023-06-19 RX ORDER — ALBUTEROL 90 UG/1
2 AEROSOL, METERED ORAL
Qty: 0 | Refills: 0 | DISCHARGE

## 2023-06-19 RX ORDER — OFLOXACIN 0.3 %
1 DROPS OPHTHALMIC (EYE)
Qty: 0 | Refills: 0 | DISCHARGE

## 2023-06-19 RX ADMIN — HEPARIN SODIUM 1400 UNIT(S)/HR: 5000 INJECTION INTRAVENOUS; SUBCUTANEOUS at 14:33

## 2023-06-19 RX ADMIN — Medication 324 MILLIGRAM(S): at 07:39

## 2023-06-19 RX ADMIN — SODIUM CHLORIDE 1000 MILLILITER(S): 9 INJECTION INTRAMUSCULAR; INTRAVENOUS; SUBCUTANEOUS at 11:00

## 2023-06-19 RX ADMIN — Medication 400 MILLIGRAM(S): at 22:54

## 2023-06-19 RX ADMIN — BUDESONIDE AND FORMOTEROL FUMARATE DIHYDRATE 2 PUFF(S): 160; 4.5 AEROSOL RESPIRATORY (INHALATION) at 20:17

## 2023-06-19 RX ADMIN — HEPARIN SODIUM 1400 UNIT(S)/HR: 5000 INJECTION INTRAVENOUS; SUBCUTANEOUS at 21:32

## 2023-06-19 RX ADMIN — Medication 5 MILLIGRAM(S): at 22:05

## 2023-06-19 RX ADMIN — ALBUTEROL 2.5 MILLIGRAM(S): 90 AEROSOL, METERED ORAL at 20:17

## 2023-06-19 RX ADMIN — HEPARIN SODIUM 7000 UNIT(S): 5000 INJECTION INTRAVENOUS; SUBCUTANEOUS at 07:55

## 2023-06-19 RX ADMIN — SODIUM CHLORIDE 1000 MILLILITER(S): 9 INJECTION INTRAMUSCULAR; INTRAVENOUS; SUBCUTANEOUS at 07:22

## 2023-06-19 RX ADMIN — SODIUM CHLORIDE 125 MILLILITER(S): 9 INJECTION, SOLUTION INTRAVENOUS at 11:16

## 2023-06-19 RX ADMIN — ONDANSETRON 4 MILLIGRAM(S): 8 TABLET, FILM COATED ORAL at 07:15

## 2023-06-19 RX ADMIN — HYDROMORPHONE HYDROCHLORIDE 1 MILLIGRAM(S): 2 INJECTION INTRAMUSCULAR; INTRAVENOUS; SUBCUTANEOUS at 15:45

## 2023-06-19 RX ADMIN — SODIUM CHLORIDE 1000 MILLILITER(S): 9 INJECTION INTRAMUSCULAR; INTRAVENOUS; SUBCUTANEOUS at 06:49

## 2023-06-19 RX ADMIN — HEPARIN SODIUM 1600 UNIT(S)/HR: 5000 INJECTION INTRAVENOUS; SUBCUTANEOUS at 11:18

## 2023-06-19 RX ADMIN — HYDROMORPHONE HYDROCHLORIDE 1 MILLIGRAM(S): 2 INJECTION INTRAMUSCULAR; INTRAVENOUS; SUBCUTANEOUS at 16:15

## 2023-06-19 RX ADMIN — HEPARIN SODIUM 1600 UNIT(S)/HR: 5000 INJECTION INTRAVENOUS; SUBCUTANEOUS at 08:00

## 2023-06-19 RX ADMIN — HYDROMORPHONE HYDROCHLORIDE 1 MILLIGRAM(S): 2 INJECTION INTRAMUSCULAR; INTRAVENOUS; SUBCUTANEOUS at 19:40

## 2023-06-19 RX ADMIN — ALBUTEROL 2.5 MILLIGRAM(S): 90 AEROSOL, METERED ORAL at 17:28

## 2023-06-19 NOTE — PATIENT PROFILE ADULT - NSPROIMPLANTSMEDDEV_GEN_A_NUR
echo, lungs Benefits, risks, and possible complications of procedure explained to patient/caregiver who verbalized understanding and gave verbal consent. None

## 2023-06-19 NOTE — ED ADULT NURSE REASSESSMENT NOTE - NS ED NURSE REASSESS COMMENT FT1
[facility]. Patient provided with urinal. Safety maintained.
Report given to CCU. Patient had 3 beats of V-tach. ICU YOSSI Bhardwaj and MD Mccarty aware.
Report received from RENA Leos. Patient is alert and oriented X3, with complaints of chest tightness. Patient placed on Tx monitor and wheeled over to CT-scan. Safety maintained.

## 2023-06-19 NOTE — ED ADULT NURSE NOTE - OBJECTIVE STATEMENT
Pt AOx4 from home s/p syncopal episode. Pt states he woke up to go to work at 4:30 am, recalls walking down the stairs and then woke up supine on the floor.  Pt states he felt sob, chest pain and dizzy after episode. Pt only complaint at this time is chest tightness. Pt denies sob, pain, dizziness, weakness, n/v/d, fevers, chills or numbness/tingling. PMHx of HTN. Unable to get oral temp at this time. Pt refusing rectal temperature. MD Mccarty made aware. pt given hot packs and blankets to re-evalute temperature in 10 minutes.

## 2023-06-19 NOTE — H&P ADULT - ASSESSMENT
A:    57M  HD # 1  FULL CODE    Here for:    1. Bilateral pulmonary embolism causing  2. Right heart strain  3. Syncope, suspect 2/2 above  4. JADON, ? CKD  5. Dehydration  6. NAGMA 2/2 lactemia  7. Hyperglycemia    This patient requires critical care for support of one or more vital organ systems with a high probability of imminent or life threatening deterioration in his/her condition    P:    B/L PE with large clot burden, evidence of right heart strain  + syncope  High risk of further decompensation    Unprovoked VTE, first episode    PESI 105 (male, SpO2 < 90%, Temp < 96.8F), also with syncope    Also with JADON, elevation in liver enzymes and hyperglycemia, elevation in LA    Seen by vascular cardiology/PERT team    Full anticoagulation heparin drip  s/p 2L NS; start LR @ 125cc/hr x 12 hours, appears dehydrated, also for MICHELLE minimization  Elevated Cr, no Hx of kidney disease; hydration should help; send UA to eval for proteinuria  Elevation in transaminases; will obtain ultrasound abdomen; can also view spleen as well  Hgb, Plt OK WNL  VTE ppx UFH drip, PUD ppx  Med rec with home meds  Add'l consultations (heme, GI) will discuss in concert with vascular cardiology  BG < 180  Send HbA1c  CBC with diff in AM  f/u UOP    Dispo: Admit to critical care. UFH drip. LE duplex. IVF.    Discussed with Dr Lester  Discussed with Pt    TOTAL CRITICAL CARE TIME: 50 minutes   (EXCLUSIVE of any non bundled procedures)    Note: This time spent INCLUDES time spent directly as this patient's bedside with evaluation, review of chart including review of laboratory and imaging studies, interpretation of vital signs and cardiac output measurements, any necessary ventilator management, and time spent discussing plan of care with patient and family, including goals of care discussion.

## 2023-06-19 NOTE — H&P ADULT - NSHPPHYSICALEXAM_GEN_ALL_CORE
O:    Adult M lying in bed  + 2L NC in place  No JVD trachea midline  S1S2+  CTA B/L  Abd soft NTND  No leg swelling/edema noted  Awake and alert  Skin pink, warm

## 2023-06-19 NOTE — PROVIDER CONTACT NOTE (CRITICAL VALUE NOTIFICATION) - ACTION/TREATMENT ORDERED:
Heparin drip reduced from 1600 units/hr to 1400 units/hr. as per protocol. Scanned via Zebra device and  Cosigned by Sherry Davila RN.

## 2023-06-19 NOTE — H&P ADULT - HISTORY OF PRESENT ILLNESS
CCU Admission    S:    Pt seen and examined  HD # 1  FULL CODE  57M  PMHx HTN, HLD, asthma; no known cardiac hx. Patient reports that he was talking to son at top of stairs; remembers starting to go down the stairs but then found himself at the bottom; (+) LOC; unknown head trauma; denies any pain s/p fall; after fall patient starting experiencing chest tightness and sob; no abdominal pain; no back pain; no weakness in arms or legs  CTA shows B/L pulmonary emboli, large clot burden on Rt  TTE shows R heart strain with + Gallagher sign    ICU consulted for CCU admission, mgmt    6/19: Lying in bed on supplemental oxygen. Heparin drip ordered.

## 2023-06-19 NOTE — ED ADULT NURSE NOTE - NSFALLRISKINTERV_ED_ALL_ED
Assistance OOB with selected safe patient handling equipment if applicable/Communicate fall risk and risk factors to all staff, patient, and family/Orthostatic vital signs/Provide visual cue: yellow wristband, yellow gown, etc/Reinforce activity limits and safety measures with patient and family/Call bell, personal items and telephone in reach/Instruct patient to call for assistance before getting out of bed/chair/stretcher/Non-slip footwear applied when patient is off stretcher/[place] to call system/Physically safe environment - no spills, clutter or unnecessary equipment/Purposeful Proactive Rounding/Room/bathroom lighting operational, light cord in reach

## 2023-06-19 NOTE — PROGRESS NOTE ADULT - ASSESSMENT
Patient with PE, unclear inciting incident except drove 2 hours yesterday in car  with PE with syncope  PESI 65  mild JADON will hydrate likely from episode , got contrast  Patient with positive Gallagher sign.  on IV heparin  rv strain on echo  on nasal cannula  will monitor in cccu for decompensation  will need eventual hematologic w/u for hypercoaguble state    Patient with PE, unclear inciting incident except drove 2 hours yesterday in car  with PE with syncope  PESI 65  mild JADON will hydrate likely from episode , got contrast  Patient with positive Gallagher sign.  on IV heparin  rv strain on echo  on nasal cannula  will monitor in cccu for decompensation  will need eventual hematologic w/u for hypercoaguzble state  venous doppler shows right peroneal dvt

## 2023-06-19 NOTE — ED PROVIDER NOTE - OBJECTIVE STATEMENT
Patient is a 58 yo male with hx of HTN, HLD, asthma; no known cardiac hx. Patient reports that he was talking to son at top of stairs; remembers starting to go down the stairs but then found himself at the bottom; (+) LOC; unknown head trauma; denies any pain s/p fall; after fall patient starting experiencing chest tightness and sob; no abdominal pain; no back pain; no weakness in arms or legs.

## 2023-06-19 NOTE — ED ADULT TRIAGE NOTE - PAIN RATING/NUMBER SCALE (0-10): ACTIVITY
Fibroscan Procedure Note    Date of Procedure: 2017    Patientâs Name: Mariajose Reyna    : 1960    Pre-Procedure Diagnosis: ETOH    : Kristan Echeverria PA-C    Ordering Provider: Jennifer García MD    Procedure Note: The patient was placed in the supine position on the exam table with right arm stretched up over the head. The xiphoid process was palpated and a horizontal line followed over from the xiphoid process to the patientâs midaxillary line. The XL probe was placed in an intercostal space at the midaxillary line with appropriate amount of pressure. Measurements of liver elasticity were taken with the probe until at least 10 accurate measurements were documented. The patient tolerated the procedure well.      Result: 75.0 kpa, 0% IQR    Interpretation: Stage 4 fibrosis - Cirrhosis
8 (severe pain)

## 2023-06-19 NOTE — CHART NOTE - NSCHARTNOTEFT_GEN_A_CORE
57-year-old male is admitted with complaints of syncope, chest tightness and shortness of breath.  Diagnosed with bilateral pulmonary emboli and with right LE DVT.  On echocardiogram has Gallagher sign positive showing right heart strain.  Elevated cardiac enzymes are noted.  Currently hemodynamically stable.  Oxygenating well with low FiO2 requirement.  Currently on IV heparin drip.  Tolerating treatment well. No previous history of DVT PE. No history of hypercoag state. No prolonged immobilization.        PAST MEDICAL & SURGICAL HISTORY:  Asthma  Hydrocele  Neck pain  Disorder of Eustachian tube, bilateral  HTN (hypertension)  Arthritis  H/O repair of rotator cuff right  left 2016        CARDIAC WORKUP:      < from: TTE Echo Complete w/o Contrast w/ Doppler (23 @ 08:34) >     The right ventricle is moderately dilated.   Right ventricular systolic function is reduced, w/ sparing of contractility at the apex consistent w/   Gallagher's sign.   Mild (1+) tricuspid valve regurgitation is present. Pulmonary artery systolic pressures are normal - PASP =38 mmHg.   The right atrium appears mildly dilated.     The left ventricle is normal in size, wall thickness, wall motion and contractility.   Estimated left ventricular ejection fraction is 70 %.   Mild aortic sclerosis.      ALLERGIES:    horses-chest tightness (Short breath; Other)  sulfa drugs (Unknown)       MEDICATIONS  (STANDING):  heparin  Infusion.  Unit(s)/Hr (16 mL/Hr) IV Continuous <Continuous>  lactated ringers. 1000 milliLiter(s) (125 mL/Hr) IV Continuous <Continuous>    MEDICATIONS  (PRN):  heparin   Injectable 7000 Unit(s) IV Push every 6 hours PRN For aPTT less than 40  heparin   Injectable 3500 Unit(s) IV Push every 6 hours PRN For aPTT between 40 - 57      FAMILY HISTORY:  FH: colon cancer (Father)  father, , age 60          SOCIAL HISTORY:  Nonsmoker. No ETOH abuse. No illicit drugs.     ROS:     Detailed ten system ROS was performed and was negative except for history as eluded to above.    no fever  no chills  no nausea  no vomiting  no diarrhea  no constipation  no melena  no hematochezia  + chest pain  no palpitations  + sob at rest  + dyspnea on exertion  no cough  no wheezing  no anorexia  no headache  no dizziness  no syncope  no weakness  no myalgia  no dysuria  no polyuria  no hematuria       Vital Signs Last 24 Hrs  T(C): 36.7 (2023 11:00), Max: 36.9 (2023 10:30)  T(F): 98 (2023 11:00), Max: 98.4 (2023 10:30)  HR: 95 (2023 11:00) (88 - 98)  BP: 113/95 (2023 10:30) (101/67 - 130/95)  BP(mean): 102 (2023 10:30) (76 - 106)  RR: 15 (2023 11:00) (12 - 21)  SpO2: 97% (2023 11:00) (92% - 98%)    O2 Parameters below as of 2023 11:00  Patient On (Oxygen Delivery Method): nasal cannula  O2 Flow (L/min): 2      PHYSICAL EXAM:    General:                Comfortable, AAO X 3, in no distress.  HEENT:                  Unremarkable.   Neck:                     Supple, no adenopathy, no thyromegaly, no JVD, no bruit.  Chest:                    Clear, B/L symmetric air entry, no tachypnea  Heart:                     S1, S2 normal, no gallop, no murmur.  Abdomen:              Soft, non-tender, bowel sounds active. No palpable masses.  Extremities:           no cyanosis, no edema.   Neurologic:            Grossly nonfocal.       TELEMETRY:  Normal sinus rhythm with no tachy or leticia events     ECG:  NSR, no ST T changes of ischemia or infarction.     LABS:               14.3   7.74  )-----------( 181      ( 2023 06:05 )             43.2           141  |  108  |  16  ----------------------------<  193<H>  3.9   |  24  |  1.36<H>    Ca    9.0      2023 06:05  Mg     2.3         TPro  7.5  /  Alb  4.0  /  TBili  0.3  /  DBili  x   /  AST  204<H>  /  ALT  208<H>  /  AlkPhos  105   @ 12:22  Trop-I  1583.88     @ 08:41  Trop-I  1091.74       @ 06:05  Trop-I  229.51  CK      256      Pro BNP   06-19 @ 06:05  15    D Dimer    @ 06:05  2493      PTT - ( 2023 13:19 )  PTT:35.7 sec      23 @ 06:05-- TSH 5.70      RADIOLOGY & ADDITIONAL STUDIES:    < from: CT Angio Chest PE Protocol w/ IV Cont (23 @ 07:10) >  IMPRESSION:  Multiple bilateral pulmonary emboli. There is CT evidence for heart strain.      < from: US Duplex Venous Lower Ext Complete, Bilateral (23 @ 13:31) >  IMPRESSION:  Acute DVT right peroneal vein.      < from: US Abdomen Complete (US Abdomen Complete .) (23 @ 13:36) >  IMPRESSION:  Hepatic steatosis.  No cholelithiasis or biliary ductal dilatation.        Assessment & Plan    Bilateral PE.  Hemodynamically stable.  Oxygenating well.  Low BNP levels.   Elevated cardiac enzymes most likely because of RV strain from PE  Right lower extremity DVT.        Continue IV anticoagulation.  Currently hemodynamically stable and oxygenating well.  No indication for mechanical embolectomy/thrombectomy.  Follow-up cardiac enzymes.

## 2023-06-19 NOTE — PROGRESS NOTE ADULT - SUBJECTIVE AND OBJECTIVE BOX
PMD Dr. Gallego    HPI:    This patient is a 57 year old male with PMH, HTN, HLD, Patient this am syncoped after walking down stairs and than    developed chest tightness, amnestic to what happened, no recent surgery, no recent covid, road in care 2 days yesterday.    no recent trauma    mild JADON, creatinine 1.3.    CTA shows bialteral PEs with right heart strain, echo normal lv function, foley sign    increased troponin, inc d-dimer.       ROS syncope, slight chest tightness , no sob, no abdominal pain, no fever, no chills,    PMH:        previous shoulder surgery    HTN    Hypercholesterolemia    Family hx. - colon ca, no hx. clots    Socia hx. non smoker, non drinker, works as        MEDICATIONS  (STANDING):  heparin  Infusion.  Unit(s)/Hr (16 mL/Hr) IV Continuous <Continuous>  lactated ringers. 1000 milliLiter(s) (125 mL/Hr) IV Continuous <Continuous>    MEDICATIONS  (PRN):  heparin   Injectable 7000 Unit(s) IV Push every 6 hours PRN For aPTT less than 40  heparin   Injectable 3500 Unit(s) IV Push every 6 hours PRN For aPTT between 40 - 57    Height (cm): 177.8 (06-19 @ 05:42)  Weight (kg): 87.4 (06-19 @ 07:13)  BMI (kg/m2): 27.6 (06-19 @ 07:13)    ICU Vital Signs Last 24 Hrs  T(C): 36.7 (19 Jun 2023 11:00), Max: 36.9 (19 Jun 2023 10:30)  T(F): 98 (19 Jun 2023 11:00), Max: 98.4 (19 Jun 2023 10:30)  HR: 95 (19 Jun 2023 11:00) (88 - 98)  BP: 113/95 (19 Jun 2023 10:30) (101/67 - 130/95)  BP(mean): 102 (19 Jun 2023 10:30) (76 - 106)  ABP: --  ABP(mean): --  RR: 15 (19 Jun 2023 11:00) (12 - 21)  SpO2: 97% (19 Jun 2023 11:00) (92% - 98%)    O2 Parameters below as of 19 Jun 2023 11:00  Patient On (Oxygen Delivery Method): nasal cannula  O2 Flow (L/min): 2    Physical Exam    General - looks well  HEENT nc/at  neck supple  lungs clear  cv rrr, no murmur  abdomen soft, non tender  extremity warm , no calf pain  skin no rash  neuro awake, alert appropriate    I&O's Summary                        14.3   7.74  )-----------( 181      ( 19 Jun 2023 06:05 )             43.2       06-19    141  |  108  |  16  ----------------------------<  193<H>  3.9   |  24  |  1.36<H>    Ca    9.0      19 Jun 2023 06:05  Mg     2.3     06-19    TPro  7.5  /  Alb  4.0  /  TBili  0.3  /  DBili  x   /  AST  204<H>  /  ALT  208<H>  /  AlkPhos  105  06-19      CARDIAC MARKERS ( 19 Jun 2023 06:05 )  x     / x     / 256 U/L / x     / x        DVT Prophylaxis:  IV heparin                                                             Advanced Directives: Full Code

## 2023-06-19 NOTE — PATIENT PROFILE ADULT - CENTRAL VENOUS CATHETER/PICC LINE
[facility] ED [place] [address]  Phone: [phone]               November 27, 2021    Patient: Emory Fermin   YOB: 2014   Date of Visit: 11/27/2021       To Whom It May Concern:    Noemy Beebe was seen and treated in our emergency department on 11/27/2021. She was brought in by her father.        Sincerely,       Jennie Ibarra RN         Signature:__________________________________ no

## 2023-06-19 NOTE — CONSULT NOTE ADULT - ASSESSMENT
Assessment/Plan  Patient is a 58 yo male with hx of HTN, HLD, asthma; no known cardiac hx. Patient reports that he was talking to son at top of stairs; remembers starting to go down the stairs but then found himself at the bottom; (+) LOC; unknown head trauma; denies any pain s/p fall; after fall patient starting experiencing chest tightness and sob; no abdominal pain; no back pain; no weakness in arms or legs.          -Continue Heparin gtt  -Continue to trend troponin  -F/U lower extremity doppler   -F/U lactate  - Continue to closely  monitor for any signs of decompensation   - Plan of care discussed with Dr. Dwyer      Assessment/Plan  Patient is a 58 yo male with hx of HTN, HLD, asthma; no known cardiac hx. Patient reports that he was talking to son at top of stairs; remembers starting to go down the stairs but then found himself at the bottom; (+) LOC; unknown head trauma; denies any pain s/p fall; after fall patient starting experiencing chest tightness and sob; no abdominal pain; no back pain; no weakness in arms or legs.    -CCU monitoring   -Continue Heparin gtt  -Continue to trend troponin  -F/U lower extremity doppler   -F/U lactate  - Continue to closely  monitor for any signs of decompensation   - Plan of care discussed with Dr. Dwyer      Assessment/Plan  Patient is a 58 yo male with hx of HTN, HLD, asthma; no known cardiac hx. Patient reports that he was talking to son at top of stairs; remembers starting to go down the stairs but then found himself at the bottom; (+) LOC; unknown head trauma; denies any pain s/p fall; after fall patient starting experiencing chest tightness and sob; Chest CT revealed   Multiple B/L pulmonary emboli +RV strain, +Gallagher strain     Pt. has intermediate high risk PESI score and a reasonable candidate for catheter based thrombolysis   Will re-asses in the morning     -CCU monitoring   -Continue Heparin gtt  -Continue to trend troponin  -F/U lower extremity doppler   -F/U lactate  - Continue to closely monitor for any signs of decompensation   - Plan of care discussed with Dr. Dwyer

## 2023-06-19 NOTE — PATIENT PROFILE ADULT - FALL HARM RISK - HARM RISK INTERVENTIONS
Communicate Risk of Fall with Harm to all staff/Reinforce activity limits and safety measures with patient and family/Tailored Fall Risk Interventions/Use of alarms - bed, chair and/or voice tab/Visual Cue: Yellow wristband and red socks/Bed in lowest position, wheels locked, appropriate side rails in place/Call bell, personal items and telephone in reach/Instruct patient to call for assistance before getting out of bed or chair/Non-slip footwear when patient is out of bed/[place] to call system/Physically safe environment - no spills, clutter or unnecessary equipment/Purposeful Proactive Rounding/Room/bathroom lighting operational, light cord in reach

## 2023-06-19 NOTE — CONSULT NOTE ADULT - SUBJECTIVE AND OBJECTIVE BOX
[facility] Vascular Cardiology Team Note                                                              [facility] /[facility] Faculty Practice                                                                      Office:  [address] [facility] 1st Floor                                                                                  [facility] [address]                                                                     Telephone: [phone]. Fax:[phone]                                                                  PERT CARDIOLOGY CONSULTATION NOTE     HISTORY OF PRESENT ILLNESS:  HPI:        Interveral/overnight events:    Allergies: horses-chest tightness (Short breath; Other)  sulfa drugs (Unknown)    MEDICATIONS  (STANDING):  heparin  Infusion.  Unit(s)/Hr (16 mL/Hr) IV Continuous <Continuous>    PHYSICAL EXAM:  T(C): 36.5 (06-19-23 @ 09:36), Max: 36.5 (06-19-23 @ 09:36)  HR: 95 (06-19-23 @ 09:36) (88 - 98)  BP: 121/96 (06-19-23 @ 09:36) (101/67 - 130/95)  RR: 18 (06-19-23 @ 09:36) (17 - 21)  SpO2: 94% (06-19-23 @ 09:36) (92% - 98%)  Wt(kg): --  I&O's Summary      GENERAL: NAD, AAOx3  CHEST/LUNG: Clear to auscultation bilaterally; No wheeze  HEART: s1 s2 Regular rate and rhythm; No murmurs, rubs, or gallops  ABDOMEN: Soft, Nontender, Nondistended; Bowel sounds present X 4 quadrants   EXTREMITIES:  2+ Peripheral Pulses, No clubbing, cyanosis, or edema  SKIN: No rashes or lesions,  b/l LE not red, cool to touch,  no open skin no drainage  NEURO: nonfocal CN/motor/sensory/reflexes  Psych: normal affect and behavior, calm and cooperative         LABS                        14.3   7.74  )-----------( 181      ( 19 Jun 2023 06:05 )             43.2     06-19    141  |  108  |  16  ----------------------------<  193<H>  3.9   |  24  |  1.36<H>    Ca    9.0      19 Jun 2023 06:05  Mg     2.3     06-19    TPro  7.5  /  Alb  4.0  /  TBili  0.3  /  DBili  x   /  AST  204<H>  /  ALT  208<H>  /  AlkPhos  105  06-19    PT/INR - ( 19 Jun 2023 06:05 )   PT: 10.4 sec;   INR: 0.90 ratio         PTT - ( 19 Jun 2023 06:05 )  PTT:28.5 sec  CARDIAC MARKERS ( 19 Jun 2023 06:05 )  x     / x     / 256 U/L / x     / x              RADIOLOGY :    US Duplex: Pending     CTA- chest  < from: CT Angio Chest PE Protocol w/ IV Cont (06.19.23 @ 07:10) >  IMPRESSION:  Multiple bilateral pulmonary emboli. There is CT evidence for heart   strain.  < from: CT Head No Cont (06.19.23 @ 07:09) >  FINDINGS:  No acute intracranial hemorrhage, mass effect or midline shift.  No CT evidence of acute large vascular territory infarct.  The ventricles and cortical sulci are within normal limits.  The visualized paranasal sinuses and mastoid air cells are well aerated.  No displaced calvarial fracture.  IMPRESSION:  No acute intracranial hemorrhage or mass effect.    ECHO   < from: TTE Echo Complete w/o Contrast w/ Doppler (06.19.23 @ 08:34) >   Impression     Summary   The right ventricle is moderately dilated.   Right ventricular systolic function is reduced, w/ sparing of   contractility at the apex consistent w/   Gallagher's sign.   Mild (1+) tricuspid valve regurgitation is present. Pulmonary artery   systolic pressures are normal - PASP =38 mmHg.   The right atrium appears mildly dilated.   The left ventricle is normal in size, wall thickness, wall motion and   contractility.   Estimated left ventricular ejection fraction is 70 %.   Mild aortic sclerosis.      CXR:     EKG: NSR @ 86BPM no acute changes     REITE Bleeding Risk:   [ ] Recent Major Bleeding (2pt)  [X ] Creatinine > 1.2 mg/dL ( 1.5pt)  [ ] Anemia (<13 g/dL M, < 12 g/dL F) (1.5 pt)  [ ] Malignancy History (1pt)  [X ] Clinically - overt PE (1pt)  [ ] Age > 75 (1pt)  Total: 2.5pt    sPESI score:   Age >80 [ ], History of Cancer [ ], Hx of chronic cardiopulmonary disease [ ], Heart rate > 110 [ ], Systolic BP <100 [ ], SpO2 <90% [ ]  mMRC score:  Patient ambulated with no change in saturations------    sPESI score:   Age >80 [ ], History of Cancer [ ], Hx of chronic cardiopulmonary disease [ ], Heart rate > 110 [ ], Systolic BP <100 [ ], SpO2 <90% [ ]  mMRC score:    Plan  - anticoagulation -----  - close monitor for any signs of decompensation   - discussed with Dr. Byrnes                                                                     [facility] Vascular Cardiology Team Note                                                              [facility] /[facility] Faculty Practice                                                                      Office:  [address] [facility] 1st Floor                                                                                  [facility] [address]                                                                     Telephone: [phone]. Fax:[phone]                                                                  PERT CARDIOLOGY CONSULTATION NOTE     HPI:  Patient is a 58 yo male with hx of HTN, HLD, asthma; no known cardiac hx. Patient reports that he was talking to son at top of stairs; remembers starting to go down the stairs but then found himself at the bottom; (+) LOC; unknown head trauma; denies any pain s/p fall; after fall patient starting experiencing chest tightness and sob; no abdominal pain; no back pain; no weakness in arms or legs.    Allergies: horses-chest tightness (Short breath; Other)  sulfa drugs (Unknown)    MEDICATIONS  (STANDING):  heparin  Infusion.  Unit(s)/Hr (16 mL/Hr) IV Continuous <Continuous>    PHYSICAL EXAM:  T(C): 36.5 (06-19-23 @ 09:36), Max: 36.5 (06-19-23 @ 09:36)  HR: 95 (06-19-23 @ 09:36) (88 - 98)  BP: 121/96 (06-19-23 @ 09:36) (101/67 - 130/95)  RR: 18 (06-19-23 @ 09:36) (17 - 21)  SpO2: 94% (06-19-23 @ 09:36) (92% - 98%)  Wt(kg): --  I&O's Summary      REVIEW OF SYSTEMS:  CONSTITUTIONAL: No fever, weight loss, or fatigue  EYES: No eye pain, visual disturbances, or discharge  ENMT:  No difficulty hearing, tinnitus, vertigo; No sinus or throat pain  NECK: No pain or stiffness  BREASTS: No pain, masses, or nipple discharge  RESPIRATORY: No cough, wheezing, chills or hemoptysis; No shortness of breath  CARDIOVASCULAR: +chest pain,  GASTROINTESTINAL: No abdominal or epigastric pain. No nausea, vomiting, or hematemesis; No diarrhea or constipation. No melena or hematochezia.  GENITOURINARY: No dysuria, frequency, hematuria, or incontinence  NEUROLOGICAL: +syncope  SKIN: No itching, burning, rashes, or lesions   ENDOCRINE: No heat or cold intolerance; No hair loss  MUSCULOSKELETAL: No joint pain or swelling; No muscle, back, or extremity pain  PSYCHIATRIC: No depression, anxiety, mood swings, or difficulty sleeping    GENERAL: NAD, AAOx3  CHEST/LUNG: Clear to auscultation bilaterally; No wheeze  HEART: s1 s2 Regular rate and rhythm; No murmurs, rubs, or gallops  ABDOMEN: Soft, Nontender, Nondistended; Bowel sounds present X 4 quadrants   EXTREMITIES:  2+ Peripheral Pulses, No clubbing, cyanosis, or edema  SKIN: No rashes or lesions,  b/l LE not red, cool to touch,  no open skin no drainage  NEURO: nonfocal CN/motor/sensory/reflexes  Psych: normal affect and behavior, calm and cooperative     LABS                        14.3   7.74  )-----------( 181      ( 19 Jun 2023 06:05 )             43.2     06-19    141  |  108  |  16  ----------------------------<  193<H>  3.9   |  24  |  1.36<H>    Ca    9.0      19 Jun 2023 06:05  Mg     2.3     06-19    TPro  7.5  /  Alb  4.0  /  TBili  0.3  /  DBili  x   /  AST  204<H>  /  ALT  208<H>  /  AlkPhos  105  06-19    PT/INR - ( 19 Jun 2023 06:05 )   PT: 10.4 sec;   INR: 0.90 ratio         PTT - ( 19 Jun 2023 06:05 )  PTT:28.5 sec  CARDIAC MARKERS ( 19 Jun 2023 06:05 )  x     / x     / 256 U/L / x     / x        RADIOLOGY :    US Duplex: Pending     CTA- chest  < from: CT Angio Chest PE Protocol w/ IV Cont (06.19.23 @ 07:10) >  IMPRESSION:  Multiple bilateral pulmonary emboli. There is CT evidence for heart   strain.  < from: CT Head No Cont (06.19.23 @ 07:09) >  FINDINGS:  No acute intracranial hemorrhage, mass effect or midline shift.  No CT evidence of acute large vascular territory infarct.  The ventricles and cortical sulci are within normal limits.  The visualized paranasal sinuses and mastoid air cells are well aerated.  No displaced calvarial fracture.  IMPRESSION:  No acute intracranial hemorrhage or mass effect.    ECHO   < from: TTE Echo Complete w/o Contrast w/ Doppler (06.19.23 @ 08:34) >   Impression     Summary   The right ventricle is moderately dilated.   Right ventricular systolic function is reduced, w/ sparing of   contractility at the apex consistent w/   Gallagher's sign.   Mild (1+) tricuspid valve regurgitation is present. Pulmonary artery   systolic pressures are normal - PASP =38 mmHg.   The right atrium appears mildly dilated.   The left ventricle is normal in size, wall thickness, wall motion and   contractility.   Estimated left ventricular ejection fraction is 70 %.   Mild aortic sclerosis.    CXR:     EKG: NSR @ 86BPM no acute changes     REITE Bleeding Risk:   [ ] Recent Major Bleeding (2pt)  [X ] Creatinine > 1.2 mg/dL ( 1.5pt)  [ ] Anemia (<13 g/dL M, < 12 g/dL F) (1.5 pt)  [ ] Malignancy History (1pt)  [X ] Clinically - overt PE (1pt)  [ ] Age > 75 (1pt)  Total: 2.5pt    sPESI score:   Age >80 [ ], History of Cancer [ ], Hx of chronic cardiopulmonary disease [ ], Heart rate > 110 [ ], Systolic BP <100 [ ], SpO2 <90% [ ]  mMRC score:  Patient ambulated with no change in saturations------    sPESI score:   Age >80 [ ], History of Cancer [ ], Hx of chronic cardiopulmonary disease [ ], Heart rate > 110 [ ], Systolic BP <100 [ ], SpO2 <90% [ ]  mMRC score:

## 2023-06-19 NOTE — ED ADULT NURSE REASSESSMENT NOTE - NSFALLRISKINTERV_ED_ALL_ED
Assistance OOB with selected safe patient handling equipment if applicable/Communicate fall risk and risk factors to all staff, patient, and family/Orthostatic vital signs/Provide visual cue: yellow wristband, yellow gown, etc/Reinforce activity limits and safety measures with patient and family/Call bell, personal items and telephone in reach/Instruct patient to call for assistance before getting out of bed/chair/stretcher/Non-slip footwear applied when patient is off stretcher/[place] to call system/Physically safe environment - no spills, clutter or unnecessary equipment/Purposeful Proactive Rounding/Room/bathroom lighting operational, light cord in reach

## 2023-06-19 NOTE — ED PROVIDER NOTE - PROGRESS NOTE DETAILS
Lul MURRAY: Cardiology team requests CCU bed; spoke with [place], ICU PA- patient to be admitted under Dr. Mittal at this time.

## 2023-06-19 NOTE — ED PROVIDER NOTE - CLINICAL SUMMARY MEDICAL DECISION MAKING FREE TEXT BOX
58 yo male s/p episode of syncope; now with chest tightness and sob; differential dx includes but not limited to PE, ACS, pna, PTX; screening ekg, chest xray; basic labs including troponin and d-dimer and re-eval.    Trop elevated; ekg repeated with no dynamic changes    D-dimer elevated; patient taken immediately to CT scan r/o PE; PE study with multiple PEs and right heart strain; full dose heparin ordered; PERT team contacted and at bedside; stat echo ordered and performed.    Endorsed to Dr. Truong for admission; tele bed per PERT team; dopplers ordered. Patient's wife and patient updated at this time.

## 2023-06-19 NOTE — ED ADULT TRIAGE NOTE - CHIEF COMPLAINT QUOTE
Pt. presents to ED with wife c/o constant chest pain. Pt. wife reports pt. getting ready for work, began having chest pain then believes he fell. Pt. wife heard noise which woke her from sleep and pt. was cleaning up water he spilled. Pt. not answering questions in triage, keeps repeating "my chest hurts, my chest is tight". Pt. denies AC, unknown headstrike Pt. taken for immediate EKG.

## 2023-06-19 NOTE — H&P ADULT - NSHPLABSRESULTS_GEN_ALL_CORE
LABS:    CBC Full  -  ( 19 Jun 2023 06:05 )  WBC Count : 7.74 K/uL  RBC Count : 4.12 M/uL  Hemoglobin : 14.3 g/dL  Hematocrit : 43.2 %  Platelet Count - Automated : 181 K/uL  Mean Cell Volume : 104.9 fl  Mean Cell Hemoglobin : 34.7 pg  Mean Cell Hemoglobin Concentration : 33.1 gm/dL  Auto Neutrophil # : 5.80 K/uL  Auto Lymphocyte # : 1.25 K/uL  Auto Monocyte # : 0.45 K/uL  Auto Eosinophil # : 0.16 K/uL  Auto Basophil # : 0.05 K/uL  Auto Neutrophil % : 75.0 %  Auto Lymphocyte % : 16.1 %  Auto Monocyte % : 5.8 %  Auto Eosinophil % : 2.1 %  Auto Basophil % : 0.6 %    06-19    141  |  108  |  16  ----------------------------<  193<H>  3.9   |  24  |  1.36<H>    Ca    9.0      19 Jun 2023 06:05  Mg     2.3     06-19    TPro  7.5  /  Alb  4.0  /  TBili  0.3  /  DBili  x   /  AST  204<H>  /  ALT  208<H>  /  AlkPhos  105  06-19    PT/INR - ( 19 Jun 2023 06:05 )   PT: 10.4 sec;   INR: 0.90 ratio         PTT - ( 19 Jun 2023 06:05 )  PTT:28.5 sec    CAPILLARY BLOOD GLUCOSE        CARDIAC MARKERS ( 19 Jun 2023 06:05 )  x     / x     / 256 U/L / x     / x          LIVER FUNCTIONS - ( 19 Jun 2023 06:05 )  Alb: 4.0 g/dL / Pro: 7.5 gm/dL / ALK PHOS: 105 U/L / ALT: 208 U/L / AST: 204 U/L / GGT: x

## 2023-06-20 LAB
ADD ON TEST-SPECIMEN IN LAB: SIGNIFICANT CHANGE UP
ALBUMIN SERPL ELPH-MCNC: 3.4 G/DL — SIGNIFICANT CHANGE UP (ref 3.3–5)
ALP SERPL-CCNC: 97 U/L — SIGNIFICANT CHANGE UP (ref 40–120)
ALT FLD-CCNC: 125 U/L — HIGH (ref 12–78)
ANION GAP SERPL CALC-SCNC: 7 MMOL/L — SIGNIFICANT CHANGE UP (ref 5–17)
APTT BLD: 102.4 SEC — HIGH (ref 27.5–35.5)
APTT BLD: 91.2 SEC — HIGH (ref 27.5–35.5)
AST SERPL-CCNC: 56 U/L — HIGH (ref 15–37)
BASOPHILS # BLD AUTO: 0.03 K/UL — SIGNIFICANT CHANGE UP (ref 0–0.2)
BASOPHILS NFR BLD AUTO: 0.3 % — SIGNIFICANT CHANGE UP (ref 0–2)
BILIRUB SERPL-MCNC: 0.5 MG/DL — SIGNIFICANT CHANGE UP (ref 0.2–1.2)
BUN SERPL-MCNC: 11 MG/DL — SIGNIFICANT CHANGE UP (ref 7–23)
CALCIUM SERPL-MCNC: 9 MG/DL — SIGNIFICANT CHANGE UP (ref 8.5–10.1)
CHLORIDE SERPL-SCNC: 105 MMOL/L — SIGNIFICANT CHANGE UP (ref 96–108)
CO2 SERPL-SCNC: 28 MMOL/L — SIGNIFICANT CHANGE UP (ref 22–31)
CREAT SERPL-MCNC: 0.92 MG/DL — SIGNIFICANT CHANGE UP (ref 0.5–1.3)
EGFR: 98 ML/MIN/1.73M2 — SIGNIFICANT CHANGE UP
EOSINOPHIL # BLD AUTO: 0.01 K/UL — SIGNIFICANT CHANGE UP (ref 0–0.5)
EOSINOPHIL NFR BLD AUTO: 0.1 % — SIGNIFICANT CHANGE UP (ref 0–6)
FOLATE SERPL-MCNC: >20 NG/ML — SIGNIFICANT CHANGE UP
GLUCOSE SERPL-MCNC: 128 MG/DL — HIGH (ref 70–99)
HCT VFR BLD CALC: 38.8 % — LOW (ref 39–50)
HCYS SERPL-MCNC: 5.1 UMOL/L — SIGNIFICANT CHANGE UP
HGB BLD-MCNC: 12.9 G/DL — LOW (ref 13–17)
IMM GRANULOCYTES NFR BLD AUTO: 0.3 % — SIGNIFICANT CHANGE UP (ref 0–0.9)
LYMPHOCYTES # BLD AUTO: 1.4 K/UL — SIGNIFICANT CHANGE UP (ref 1–3.3)
LYMPHOCYTES # BLD AUTO: 15.3 % — SIGNIFICANT CHANGE UP (ref 13–44)
MAGNESIUM SERPL-MCNC: 2 MG/DL — SIGNIFICANT CHANGE UP (ref 1.6–2.6)
MCHC RBC-ENTMCNC: 33.2 GM/DL — SIGNIFICANT CHANGE UP (ref 32–36)
MCHC RBC-ENTMCNC: 34.1 PG — HIGH (ref 27–34)
MCV RBC AUTO: 102.6 FL — HIGH (ref 80–100)
MONOCYTES # BLD AUTO: 0.88 K/UL — SIGNIFICANT CHANGE UP (ref 0–0.9)
MONOCYTES NFR BLD AUTO: 9.6 % — SIGNIFICANT CHANGE UP (ref 2–14)
NEUTROPHILS # BLD AUTO: 6.78 K/UL — SIGNIFICANT CHANGE UP (ref 1.8–7.4)
NEUTROPHILS NFR BLD AUTO: 74.4 % — SIGNIFICANT CHANGE UP (ref 43–77)
PHOSPHATE SERPL-MCNC: 3.6 MG/DL — SIGNIFICANT CHANGE UP (ref 2.5–4.5)
PLATELET # BLD AUTO: 204 K/UL — SIGNIFICANT CHANGE UP (ref 150–400)
POTASSIUM SERPL-MCNC: 4.1 MMOL/L — SIGNIFICANT CHANGE UP (ref 3.5–5.3)
POTASSIUM SERPL-SCNC: 4.1 MMOL/L — SIGNIFICANT CHANGE UP (ref 3.5–5.3)
PROT SERPL-MCNC: 6.5 GM/DL — SIGNIFICANT CHANGE UP (ref 6–8.3)
RBC # BLD: 3.78 M/UL — LOW (ref 4.2–5.8)
RBC # FLD: 13.8 % — SIGNIFICANT CHANGE UP (ref 10.3–14.5)
SODIUM SERPL-SCNC: 140 MMOL/L — SIGNIFICANT CHANGE UP (ref 135–145)
TROPONIN I, HIGH SENSITIVITY RESULT: 421.12 NG/L — HIGH
VIT B12 SERPL-MCNC: 374 PG/ML — SIGNIFICANT CHANGE UP (ref 232–1245)
WBC # BLD: 9.13 K/UL — SIGNIFICANT CHANGE UP (ref 3.8–10.5)
WBC # FLD AUTO: 9.13 K/UL — SIGNIFICANT CHANGE UP (ref 3.8–10.5)

## 2023-06-20 PROCEDURE — 99291 CRITICAL CARE FIRST HOUR: CPT

## 2023-06-20 PROCEDURE — 99232 SBSQ HOSP IP/OBS MODERATE 35: CPT

## 2023-06-20 RX ADMIN — HEPARIN SODIUM 1400 UNIT(S)/HR: 5000 INJECTION INTRAVENOUS; SUBCUTANEOUS at 04:39

## 2023-06-20 RX ADMIN — ALBUTEROL 2.5 MILLIGRAM(S): 90 AEROSOL, METERED ORAL at 20:26

## 2023-06-20 RX ADMIN — BUDESONIDE AND FORMOTEROL FUMARATE DIHYDRATE 2 PUFF(S): 160; 4.5 AEROSOL RESPIRATORY (INHALATION) at 20:27

## 2023-06-20 RX ADMIN — Medication 5 MILLIGRAM(S): at 21:49

## 2023-06-20 RX ADMIN — ALBUTEROL 2.5 MILLIGRAM(S): 90 AEROSOL, METERED ORAL at 08:24

## 2023-06-20 RX ADMIN — HEPARIN SODIUM 1400 UNIT(S)/HR: 5000 INJECTION INTRAVENOUS; SUBCUTANEOUS at 01:35

## 2023-06-20 RX ADMIN — ALBUTEROL 2.5 MILLIGRAM(S): 90 AEROSOL, METERED ORAL at 13:06

## 2023-06-20 NOTE — PROGRESS NOTE ADULT - SUBJECTIVE AND OBJECTIVE BOX
[facility] Vascular Cardiology Team Note                                                              [facility] /[facility] Faculty Practice                                                                      Office:  [address] Card[facility] 1st Floor                                                                                  [facility] [address]                                                                     Telephone: [phone]. Fax:[phone]                                                                          VASCULAR CARDIOLOGY  NOTE     HISTORY OF PRESENT ILLNESS:  CCU Admission  S: syncope, SOB and chest pain     Pt seen and examined  HD # 1  FULL CODE  57M with PMHx HTN, HLD, asthma; no known cardiac hx. Patient reports that he was talking to son at top of stairs; remembers starting to go down the stairs but then found himself at the bottom; (+) LOC; unknown head trauma; denies any pain s/p fall; after fall patient starting experiencing chest tightness and sob; no abdominal pain; no back pain; no weakness in arms or legs.   Denies long term immobilization, long car driving or flight, denies smoking or family history of coagulation disorder   In ED, CT head with no acute findings, elevated troponin: 229.51, normal BNP, elevated in LFT, lactate 3.2. + D-dimer   CTA shows B/L pulmonary emboli, large clot burden on Rt  TTE shows R heart strain with + Gallagher sign  Pt admitted to CCU for further management    Interval/ overnight events: pt was seen at the bedside, on nebulizer treatment, Pt states "not feeling any better, now chest pain is sharp and on shallow breath, pain in Rt foot"     Hospital Course:   6/19: PERT consulted, started Heparin drip    Allergies  horses-chest tightness (Short breath; Other)  sulfa drugs (Unknown)    MEDICATIONS:  MEDICATIONS  (STANDING):  albuterol    0.083% 2.5 milliGRAM(s) Nebulizer every 6 hours  budesonide 160 MICROgram(s)/formoterol 4.5 MICROgram(s) Inhaler 2 Puff(s) Inhalation two times a day  heparin  Infusion.  Unit(s)/Hr (16 mL/Hr) IV Continuous <Continuous>  melatonin 5 milliGRAM(s) Oral at bedtime    PHYSICAL EXAM:  T(C): 36.4 (06-20-23 @ 04:46), Max: 36.9 (06-19-23 @ 10:30)  HR: 92 (06-20-23 @ 08:27) (73 - 96)  BP: 122/93 (06-20-23 @ 07:00) (94/76 - 135/88)  RR: 17 (06-20-23 @ 07:00) (12 - 22)  SpO2: 99% (06-20-23 @ 07:00) (91% - 99%)  I&O's Summary    19 Jun 2023 07:01  -  20 Jun 2023 07:00  --------------------------------------------------------  IN: 1508 mL / OUT: 700 mL / NET: 808 mL    20 Jun 2023 07:01  -  20 Jun 2023 09:18  --------------------------------------------------------  IN: 0 mL / OUT: 850 mL / NET: -850 mL      GENERAL: NAD, AAOx3  CHEST/LUNG: no rales, No wheeze  HEART: s1 s2 Regular rate and rhythm; No murmurs, rubs, or gallops  ABDOMEN: Soft, Nontender, Nondistended; Bowel sounds present X 4 quadrants   EXTREMITIES:  2+ Peripheral Pulses, No clubbing, cyanosis, or edema  SKIN: No rashes or lesions,  b/l LE not red, cool to touch,  no open skin no drainage  NEURO: nonfocal CN/motor/sensory/reflexes  Psych: normal affect and behavior, calm and cooperative     LABS                        12.9   9.13  )-----------( 204      ( 20 Jun 2023 05:47 )             38.8     06-20    140  |  105  |  11  ----------------------------<  128<H>  4.1   |  28  |  0.92    Ca    9.0      20 Jun 2023 05:47  Phos  3.6     06-20  Mg     2.0     06-20    TPro  6.5  /  Alb  3.4  /  TBili  0.5  /  DBili  x   /  AST  56<H>  /  ALT  125<H>  /  AlkPhos  97  06-20  PT/INR - ( 19 Jun 2023 06:05 )   PT: 10.4 sec;   INR: 0.90 ratio    PTT - ( 20 Jun 2023 05:47 )  PTT:102.4 sec  CARDIAC MARKERS ( 19 Jun 2023 06:05 )  x     / x     / 256 U/L / x     / x        RADIOLOGY :  CT Angio chest < from: CT Angio Chest PE Protocol w/ IV Cont (06.19.23 @ 07:10) >  FINDINGS:    LUNGS AND AIRWAYS: Patent central airways.. There are mild nonspecific   groundglass opacities in the right upper lobe. There is prominent right   lower lobe periosteophyte fibrosis.  PLEURA: No pleural effusion.  MEDIASTINUM AND SCOOBY: No lymphadenopathy.  VESSELS: Multiple pulmonary emboli, in the right upper, middle and lower   lobar and segmental branches. Left-sided pulmonary emboli in the left   upper, lower lobe segmental branches are also present.  HEART: Heart size is normal. No pericardial effusion. Coronary artery   calcifications. There is CT evidence of right heart strain.  CHEST WALL AND LOWER NECK: Within normal limits.  VISUALIZED UPPER ABDOMEN: Grossly unremarkable.  BONES: Degenerative changes in the spine.    IMPRESSION:  Multiple bilateral pulmonary emboli. There is CT evidence for heart strain       US Duplex Venous LE B/L   < from: US Duplex Venous Lower Ext Complete, Bilateral (06.19.23 @ 13:31) >  FINDINGS:    RIGHT:  Normal compressibility of the RIGHT common femoral, femoral and popliteal   veins.  Doppler examination shows normal spontaneous and phasic flow.  Acute DVT is identified within the right peroneal vein.  There is patency of the right posterior tibial, soleal and gastrocnemius   veins.    LEFT:  Normal compressibility of the LEFT common femoral, femoral and popliteal   veins.  Doppler examination shows normal spontaneous and phasic flow.  No LEFT calf vein thrombosis is detected.    IMPRESSION:  Acute DVT right peroneal vein.    ECHO   < from: TTE Echo Complete w/o Contrast w/ Doppler (06.19.23 @ 08:34) >   Impression  Summary  The right ventricle is moderately dilated.   Right ventricular systolic function is reduced, w/ sparing of   contractility at the apex consistent w/   Gallagher's sign.   Mild (1+) tricuspid valve regurgitation is present. Pulmonary artery   systolic pressures are normal - PASP =38 mmHg.   The right atrium appears mildly dilated.     The left ventricle is normal in size, wall thickness, wall motion and   contractility.   Estimated left ventricular ejection fraction is 70 %.   Mild aortic sclerosis.  < end of copied text >    CXR  < from: Xray Chest 1 View- PORTABLE-Urgent (Xray Chest 1 View- PORTABLE-Urgent .) (06.19.23 @ 07:01) >  FINDINGS:    Single frontal view of the chest demonstrates the lungs to be clear. The   cardiomediastinal silhouette is normal. No acute osseous abnormalities.   Overlying EKG leads and wires are noted. Please refer to the subsequent   chest CT for further details.    IMPRESSION: No acute cardiopulmonary disease process.  < end of copied text >    EKG (6/19/23): NSR at 86 bpm     REITE Bleeding Risk: 2.5 points   [ ] Recent Major Bleeding (2pt)  [ x] Creatinine > 1.2 mg/dL ( 1.5pt)  [ ] Anemia (<13 g/dL M, < 12 g/dL F) (1.5 pt)  [ ] Malignancy History (1pt)  [ x] Clinically - overt PE (1pt)  [ ] Age > 75 (1pt)    BACS Bleeding Risk: 1 point   [ ] Recent Major Bleeding (3pt)  [ ] Age > 75 (1pt)  [ ] Active Cancer (1pt)  [x ] Syncope (1pt)      sPESI score:   Age >80 [ ], History of Cancer [ ], Hx of chronic cardiopulmonary disease [ ], Heart rate > 110 [ ], Systolic BP <100 [ ], SpO2 <90% [ ]  mMRC score:    Assessment/ Plan   57M with PMHx HTN, HLD, asthma presented to ED with s/p fall and syncopal episode, found to have B/L multiple pulmonary emboli, + RV strain and McConell's sign on Echo,+ Rt  peroneal DVT. Pt is under intermediate High PE risk. O2 sat at low 90 to mid 90% on RA, Pt is on heparin gtt with therapeutic PTT on over 24 hrs with minimal improvement on symptoms.  - continue Heparin gtt   - close monitor for any signs of decompensation   - invasive intervention might be beneficial, CDT vs thrombectomy   - will discuss with Dr. Dwyer and Dr. Byrnes                                                                     [facility] Vascular Cardiology Team Note                                                              [facility] /[facility] Faculty [facility]                                                                      Office:  [address] Cardiac Clinic 1st Floor                                                                                  [facility] [address]                                                                     Telephone: [phone]. Fax:[phone]                                                                          VASCULAR CARDIOLOGY  NOTE     HISTORY OF PRESENT ILLNESS:  CCU Admission  S: syncope, SOB and chest pain     Pt seen and examined  HD # 1  FULL CODE  57M with PMHx HTN, HLD, asthma; no known cardiac hx. Patient reports that he was talking to son at top of stairs; remembers starting to go down the stairs but then found himself at the bottom; (+) LOC; unknown head trauma; denies any pain s/p fall; after fall patient starting experiencing chest tightness and sob; no abdominal pain; no back pain; no weakness in arms or legs.   Denies long term immobilization, long car driving or flight, denies smoking or family history of coagulation disorder   In ED, CT head with no acute findings, elevated troponin: 229.51, normal BNP, elevated in LFT, lactate 3.2. + D-dimer   CTA shows B/L pulmonary emboli, large clot burden on Rt  TTE shows R heart strain with + Gallagher sign  Pt admitted to CCU for further management    Interval/ overnight events: pt was seen at the bedside this morning, on nebulizer treatment, Pt states "not feeling any better, now chest pain is sharp and on shallow breath, pain in Rt foot". Re-assessed the patient this afternoon, sitting on the chair and reported feeling much better with minimal chest discomfort. Pt said ambulated around the station and it was tolerable.     Hospital Course:   6/19: PERT consulted, started Heparin drip    Allergies  horses-chest tightness (Short breath; Other)  sulfa drugs (Unknown)    MEDICATIONS:  MEDICATIONS  (STANDING):  albuterol    0.083% 2.5 milliGRAM(s) Nebulizer every 6 hours  budesonide 160 MICROgram(s)/formoterol 4.5 MICROgram(s) Inhaler 2 Puff(s) Inhalation two times a day  heparin  Infusion.  Unit(s)/Hr (16 mL/Hr) IV Continuous <Continuous>  melatonin 5 milliGRAM(s) Oral at bedtime    PHYSICAL EXAM:  T(C): 36.4 (06-20-23 @ 04:46), Max: 36.9 (06-19-23 @ 10:30)  HR: 92 (06-20-23 @ 08:27) (73 - 96)  BP: 122/93 (06-20-23 @ 07:00) (94/76 - 135/88)  RR: 17 (06-20-23 @ 07:00) (12 - 22)  SpO2: 99% (06-20-23 @ 07:00) (91% - 99%)  I&O's Summary    19 Jun 2023 07:01  -  20 Jun 2023 07:00  --------------------------------------------------------  IN: 1508 mL / OUT: 700 mL / NET: 808 mL    20 Jun 2023 07:01  -  20 Jun 2023 09:18  --------------------------------------------------------  IN: 0 mL / OUT: 850 mL / NET: -850 mL      GENERAL: NAD, AAOx3  CHEST/LUNG: no rales, No wheeze  HEART: s1 s2 Regular rate and rhythm; No murmurs, rubs, or gallops  ABDOMEN: Soft, Nontender, Nondistended; Bowel sounds present X 4 quadrants   EXTREMITIES:  2+ Peripheral Pulses, No clubbing, cyanosis, or edema  SKIN: No rashes or lesions,  b/l LE not red, cool to touch,  no open skin no drainage  NEURO: nonfocal CN/motor/sensory/reflexes  Psych: normal affect and behavior, calm and cooperative     LABS                        12.9   9.13  )-----------( 204      ( 20 Jun 2023 05:47 )             38.8     06-20    140  |  105  |  11  ----------------------------<  128<H>  4.1   |  28  |  0.92    Ca    9.0      20 Jun 2023 05:47  Phos  3.6     06-20  Mg     2.0     06-20    TPro  6.5  /  Alb  3.4  /  TBili  0.5  /  DBili  x   /  AST  56<H>  /  ALT  125<H>  /  AlkPhos  97  06-20  PT/INR - ( 19 Jun 2023 06:05 )   PT: 10.4 sec;   INR: 0.90 ratio    PTT - ( 20 Jun 2023 05:47 )  PTT:102.4 sec  CARDIAC MARKERS ( 19 Jun 2023 06:05 )  x     / x     / 256 U/L / x     / x        RADIOLOGY :  CT Angio chest < from: CT Angio Chest PE Protocol w/ IV Cont (06.19.23 @ 07:10) >  FINDINGS:    LUNGS AND AIRWAYS: Patent central airways.. There are mild nonspecific   groundglass opacities in the right upper lobe. There is prominent right   lower lobe periosteophyte fibrosis.  PLEURA: No pleural effusion.  MEDIASTINUM AND SCOOBY: No lymphadenopathy.  VESSELS: Multiple pulmonary emboli, in the right upper, middle and lower   lobar and segmental branches. Left-sided pulmonary emboli in the left   upper, lower lobe segmental branches are also present.  HEART: Heart size is normal. No pericardial effusion. Coronary artery   calcifications. There is CT evidence of right heart strain.  CHEST WALL AND LOWER NECK: Within normal limits.  VISUALIZED UPPER ABDOMEN: Grossly unremarkable.  BONES: Degenerative changes in the spine.    IMPRESSION:  Multiple bilateral pulmonary emboli. There is CT evidence for heart strain       US Duplex Venous LE B/L   < from: US Duplex Venous Lower Ext Complete, Bilateral (06.19.23 @ 13:31) >  FINDINGS:    RIGHT:  Normal compressibility of the RIGHT common femoral, femoral and popliteal   veins.  Doppler examination shows normal spontaneous and phasic flow.  Acute DVT is identified within the right peroneal vein.  There is patency of the right posterior tibial, soleal and gastrocnemius   veins.    LEFT:  Normal compressibility of the LEFT common femoral, femoral and popliteal   veins.  Doppler examination shows normal spontaneous and phasic flow.  No LEFT calf vein thrombosis is detected.    IMPRESSION:  Acute DVT right peroneal vein.    ECHO   < from: TTE Echo Complete w/o Contrast w/ Doppler (06.19.23 @ 08:34) >   Impression  Summary  The right ventricle is moderately dilated.   Right ventricular systolic function is reduced, w/ sparing of   contractility at the apex consistent w/   Gallagher's sign.   Mild (1+) tricuspid valve regurgitation is present. Pulmonary artery   systolic pressures are normal - PASP =38 mmHg.   The right atrium appears mildly dilated.     The left ventricle is normal in size, wall thickness, wall motion and   contractility.   Estimated left ventricular ejection fraction is 70 %.   Mild aortic sclerosis.  < end of copied text >    CXR  < from: Xray Chest 1 View- PORTABLE-Urgent (Xray Chest 1 View- PORTABLE-Urgent .) (06.19.23 @ 07:01) >  FINDINGS:    Single frontal view of the chest demonstrates the lungs to be clear. The   cardiomediastinal silhouette is normal. No acute osseous abnormalities.   Overlying EKG leads and wires are noted. Please refer to the subsequent   chest CT for further details.    IMPRESSION: No acute cardiopulmonary disease process.  < end of copied text >    EKG (6/19/23): NSR at 86 bpm     REITE Bleeding Risk: 2.5 points   [ ] Recent Major Bleeding (2pt)  [ x] Creatinine > 1.2 mg/dL ( 1.5pt)  [ ] Anemia (<13 g/dL M, < 12 g/dL F) (1.5 pt)  [ ] Malignancy History (1pt)  [ x] Clinically - overt PE (1pt)  [ ] Age > 75 (1pt)    BACS Bleeding Risk: 1 point   [ ] Recent Major Bleeding (3pt)  [ ] Age > 75 (1pt)  [ ] Active Cancer (1pt)  [x ] Syncope (1pt)      sPESI score:   Age >80 [ ], History of Cancer [ ], Hx of chronic cardiopulmonary disease [ ], Heart rate > 110 [ ], Systolic BP <100 [ ], SpO2 <90% [ ]  mMRC score:    Assessment/ Plan   57M with PMHx HTN, HLD, asthma presented to ED with s/p fall and syncopal episode, found to have B/L multiple pulmonary emboli, + RV strain and McConell's sign on Echo,+ Rt  peroneal DVT. Pt is under intermediate High PE risk. O2 sat at low 90 to mid 90% on RA, Pt is on heparin gtt with therapeutic PTT on over 24 hrs, now improving symptoms.   - continue Heparin gtt for 24-48 hrs, maintain therapeutic PTT   - close monitorign for any signs of decompensation   - no urgent invasive procedure at this time   - will assess ambulating saturation this afternoon  - vascular cardiology team will follow     Discussed with Dr. Byrnes, Dr. Lester and Pt.                                                                     [facility] Vascular Cardiology Team Note                                                              [facility] /[facility]                                                                      Office:  [address] Cardiac Clinic 1st Floor                                                                                  [facility] [address]                                                                     Telephone: [phone]. Fax:[phone]                                                                          VASCULAR CARDIOLOGY  NOTE     HISTORY OF PRESENT ILLNESS:  CCU Admission  S: syncope, SOB and chest pain     Pt seen and examined  HD # 1  FULL CODE  57M with PMHx HTN, HLD, asthma; no known cardiac hx. Patient reports that he was talking to son at top of stairs; remembers starting to go down the stairs but then found himself at the bottom; (+) LOC; unknown head trauma; denies any pain s/p fall; after fall patient starting experiencing chest tightness and sob; no abdominal pain; no back pain; no weakness in arms or legs.   Denies long term immobilization, long car driving or flight, denies smoking or family history of coagulation disorder   In ED, CT head with no acute findings, elevated troponin: 229.51, normal BNP, elevated in LFT, lactate 3.2. + D-dimer   CTA shows B/L pulmonary emboli, large clot burden on Rt  TTE shows R heart strain with + Gallagher sign  Pt admitted to CCU for further management    Interval/ overnight events: pt was seen at the bedside this morning, on nebulizer treatment, Pt states "not feeling any better, now chest pain is sharp and on shallow breath, pain in Rt foot". Re-assessed the patient this afternoon, sitting on the chair and reported feeling much better with minimal chest discomfort. Pt said ambulated around the station and it was tolerable.     Hospital Course:   6/19: PERT consulted, started Heparin drip    Allergies  horses-chest tightness (Short breath; Other)  sulfa drugs (Unknown)    MEDICATIONS:  MEDICATIONS  (STANDING):  albuterol    0.083% 2.5 milliGRAM(s) Nebulizer every 6 hours  budesonide 160 MICROgram(s)/formoterol 4.5 MICROgram(s) Inhaler 2 Puff(s) Inhalation two times a day  heparin  Infusion.  Unit(s)/Hr (16 mL/Hr) IV Continuous <Continuous>  melatonin 5 milliGRAM(s) Oral at bedtime    PHYSICAL EXAM:  T(C): 36.4 (06-20-23 @ 04:46), Max: 36.9 (06-19-23 @ 10:30)  HR: 92 (06-20-23 @ 08:27) (73 - 96)  BP: 122/93 (06-20-23 @ 07:00) (94/76 - 135/88)  RR: 17 (06-20-23 @ 07:00) (12 - 22)  SpO2: 99% (06-20-23 @ 07:00) (91% - 99%)  I&O's Summary    19 Jun 2023 07:01  -  20 Jun 2023 07:00  --------------------------------------------------------  IN: 1508 mL / OUT: 700 mL / NET: 808 mL    20 Jun 2023 07:01  -  20 Jun 2023 09:18  --------------------------------------------------------  IN: 0 mL / OUT: 850 mL / NET: -850 mL      GENERAL: NAD, AAOx3  CHEST/LUNG: no rales, No wheeze  HEART: s1 s2 Regular rate and rhythm; No murmurs, rubs, or gallops  ABDOMEN: Soft, Nontender, Nondistended; Bowel sounds present X 4 quadrants   EXTREMITIES:  2+ Peripheral Pulses, No clubbing, cyanosis, or edema  SKIN: No rashes or lesions,  b/l LE not red, cool to touch,  no open skin no drainage  NEURO: nonfocal CN/motor/sensory/reflexes  Psych: normal affect and behavior, calm and cooperative     LABS                        12.9   9.13  )-----------( 204      ( 20 Jun 2023 05:47 )             38.8     06-20    140  |  105  |  11  ----------------------------<  128<H>  4.1   |  28  |  0.92    Ca    9.0      20 Jun 2023 05:47  Phos  3.6     06-20  Mg     2.0     06-20    TPro  6.5  /  Alb  3.4  /  TBili  0.5  /  DBili  x   /  AST  56<H>  /  ALT  125<H>  /  AlkPhos  97  06-20  PT/INR - ( 19 Jun 2023 06:05 )   PT: 10.4 sec;   INR: 0.90 ratio    PTT - ( 20 Jun 2023 05:47 )  PTT:102.4 sec  CARDIAC MARKERS ( 19 Jun 2023 06:05 )  x     / x     / 256 U/L / x     / x        RADIOLOGY :  CT Angio chest < from: CT Angio Chest PE Protocol w/ IV Cont (06.19.23 @ 07:10) >  FINDINGS:    LUNGS AND AIRWAYS: Patent central airways.. There are mild nonspecific   groundglass opacities in the right upper lobe. There is prominent right   lower lobe periosteophyte fibrosis.  PLEURA: No pleural effusion.  MEDIASTINUM AND SCOOBY: No lymphadenopathy.  VESSELS: Multiple pulmonary emboli, in the right upper, middle and lower   lobar and segmental branches. Left-sided pulmonary emboli in the left   upper, lower lobe segmental branches are also present.  HEART: Heart size is normal. No pericardial effusion. Coronary artery   calcifications. There is CT evidence of right heart strain.  CHEST WALL AND LOWER NECK: Within normal limits.  VISUALIZED UPPER ABDOMEN: Grossly unremarkable.  BONES: Degenerative changes in the spine.    IMPRESSION:  Multiple bilateral pulmonary emboli. There is CT evidence for heart strain       US Duplex Venous LE B/L   < from: US Duplex Venous Lower Ext Complete, Bilateral (06.19.23 @ 13:31) >  FINDINGS:    RIGHT:  Normal compressibility of the RIGHT common femoral, femoral and popliteal   veins.  Doppler examination shows normal spontaneous and phasic flow.  Acute DVT is identified within the right peroneal vein.  There is patency of the right posterior tibial, soleal and gastrocnemius   veins.    LEFT:  Normal compressibility of the LEFT common femoral, femoral and popliteal   veins.  Doppler examination shows normal spontaneous and phasic flow.  No LEFT calf vein thrombosis is detected.    IMPRESSION:  Acute DVT right peroneal vein.    ECHO   < from: TTE Echo Complete w/o Contrast w/ Doppler (06.19.23 @ 08:34) >   Impression  Summary  The right ventricle is moderately dilated.   Right ventricular systolic function is reduced, w/ sparing of   contractility at the apex consistent w/   Gallagher's sign.   Mild (1+) tricuspid valve regurgitation is present. Pulmonary artery   systolic pressures are normal - PASP =38 mmHg.   The right atrium appears mildly dilated.     The left ventricle is normal in size, wall thickness, wall motion and   contractility.   Estimated left ventricular ejection fraction is 70 %.   Mild aortic sclerosis.  < end of copied text >    CXR  < from: Xray Chest 1 View- PORTABLE-Urgent (Xray Chest 1 View- PORTABLE-Urgent .) (06.19.23 @ 07:01) >  FINDINGS:    Single frontal view of the chest demonstrates the lungs to be clear. The   cardiomediastinal silhouette is normal. No acute osseous abnormalities.   Overlying EKG leads and wires are noted. Please refer to the subsequent   chest CT for further details.    IMPRESSION: No acute cardiopulmonary disease process.  < end of copied text >    EKG (6/19/23): NSR at 86 bpm     REITE Bleeding Risk: 2.5 points   [ ] Recent Major Bleeding (2pt)  [ x] Creatinine > 1.2 mg/dL ( 1.5pt)  [ ] Anemia (<13 g/dL M, < 12 g/dL F) (1.5 pt)  [ ] Malignancy History (1pt)  [ x] Clinically - overt PE (1pt)  [ ] Age > 75 (1pt)    BACS Bleeding Risk: 1 point   [ ] Recent Major Bleeding (3pt)  [ ] Age > 75 (1pt)  [ ] Active Cancer (1pt)  [x ] Syncope (1pt)      sPESI score:   Age >80 [ ], History of Cancer [ ], Hx of chronic cardiopulmonary disease [ ], Heart rate > 110 [ ], Systolic BP <100 [ ], SpO2 <90% [ ]  mMRC score:    Assessment/ Plan   57M with PMHx HTN, HLD, asthma presented to ED with s/p fall and syncopal episode, found to have B/L multiple pulmonary emboli, + RV strain and McConell's sign on Echo,+ Rt  peroneal DVT. Pt is under intermediate High PE risk. O2 sat at low 90 to mid 90% on RA, Pt is on heparin gtt with therapeutic PTT on over 24 hrs, now improving symptoms.   - continue Heparin gtt for 24-48 hrs, maintain therapeutic PTT   - close monitorign for any signs of decompensation   - no urgent invasive procedure at this time   - will assess ambulating saturation this afternoon  - vascular cardiology team will follow     Discussed with Dr. Byrnes, Dr. Lester and Pt.     Addendum:  The case was discussed with Dr. Awan by Dr. Byrnes (Dr. Ram is pt's family cardiologist) and Pt will be managed by Dr. Awan's team. Thank you for involving us in patient care, Vascular cardiology team sign off today.

## 2023-06-20 NOTE — PROGRESS NOTE ADULT - SUBJECTIVE AND OBJECTIVE BOX
Patient is a 57y old  Male who presents with a chief complaint of B/L pulmonary elboli (19 Jun 2023 11:22)    PAST MEDICAL & SURGICAL HISTORY:  Asthma  controlled; never intubated      Hydrocele  right      Neck pain  related to arthritis      Disorder of Eustachian tube, bilateral      HTN (hypertension)      Arthritis      H/O repair of rotator cuff  right 2007 left 2016        YU LACEY   57y    Male    BRIEF HOSPITAL COURSE:    Review of Systems:                       All other ROS are negative.    Allergies    horses-chest tightness (Short breath; Other)  sulfa drugs (Unknown)    Intolerances      Weight (kg): 87.4 (06-19-23 @ 07:13)  BMI (kg/m2): 27.6 (06-19-23 @ 07:13)    ICU Vital Signs Last 24 Hrs  T(C): 36.6 (19 Jun 2023 21:51), Max: 36.9 (19 Jun 2023 10:30)  T(F): 97.8 (19 Jun 2023 21:51), Max: 98.4 (19 Jun 2023 10:30)  HR: 82 (20 Jun 2023 00:00) (82 - 98)  BP: 106/75 (20 Jun 2023 00:00) (101/67 - 135/88)  BP(mean): 83 (20 Jun 2023 00:00) (76 - 106)  ABP: --  ABP(mean): --  RR: 17 (20 Jun 2023 00:00) (12 - 22)  SpO2: 94% (20 Jun 2023 00:00) (91% - 99%)    O2 Parameters below as of 19 Jun 2023 20:20  Patient On (Oxygen Delivery Method): nasal cannula, 4L          Physical Examination:    General:  NAD    HEENT: no JVD    PULM: bilateral BS     CVS: s1 s2 reg    ABD: soft NT     EXT: no edema     SKIN: warm    Neuro:  alert           LABS:                        13.0   8.36  )-----------( 182      ( 19 Jun 2023 13:54 )             38.5     06-19    141  |  108  |  16  ----------------------------<  193<H>  3.9   |  24  |  1.36<H>    Ca    9.0      19 Jun 2023 06:05  Mg     2.3     06-19    TPro  7.5  /  Alb  4.0  /  TBili  0.3  /  DBili  x   /  AST  204<H>  /  ALT  208<H>  /  AlkPhos  105  06-19      CARDIAC MARKERS ( 19 Jun 2023 06:05 )  x     / x     / 256 U/L / x     / x          CAPILLARY BLOOD GLUCOSE        PT/INR - ( 19 Jun 2023 06:05 )   PT: 10.4 sec;   INR: 0.90 ratio         PTT - ( 19 Jun 2023 20:21 )  PTT:98.3 sec    CULTURES:      Medications:  MEDICATIONS  (STANDING):  albuterol    0.083% 2.5 milliGRAM(s) Nebulizer every 6 hours  budesonide 160 MICROgram(s)/formoterol 4.5 MICROgram(s) Inhaler 2 Puff(s) Inhalation two times a day  heparin  Infusion.  Unit(s)/Hr (16 mL/Hr) IV Continuous <Continuous>  lactated ringers. 1000 milliLiter(s) (125 mL/Hr) IV Continuous <Continuous>  melatonin 5 milliGRAM(s) Oral at bedtime    MEDICATIONS  (PRN):  heparin   Injectable 7000 Unit(s) IV Push every 6 hours PRN For aPTT less than 40  heparin   Injectable 3500 Unit(s) IV Push every 6 hours PRN For aPTT between 40 - 57  oxycodone    5 mG/acetaminophen 325 mG 1 Tablet(s) Oral every 4 hours PRN Moderate Pain (4 - 6)        06-19 @ 07:01  -  06-20 @ 04:35  --------------------------------------------------------  IN: 1508 mL / OUT: 700 mL / NET: 808 mL        RADIOLOGY/IMAGING/ECHO    < from: CT Angio Chest PE Protocol w/ IV Cont (06.19.23 @ 07:10) >    Multiple bilateral pulmonary emboli. There is CT evidence for heart   strain.      < end of copied text >      < from: US Duplex Venous Lower Ext Complete, Bilateral (06.19.23 @ 13:31) >  IMPRESSION:    Acute DVT right peroneal vein.      < from: US Abdomen Complete (US Abdomen Complete .) (06.19.23 @ 13:36) >  Hepatic steatosis.  No cholelithiasis or biliary ductal dilatation.        Assessment/Plan:    57M with bilateral PE High risk with Gallagher's echo sign  with an associated R BTK DVT     Unprovoked     CP  with + trop but without EKG changes     UFH for now pending cathter based thrombectomy procedures   eventual  hypercoag w/u > DOAC     Elevated transaminases >  fatty liver on US.     Macrocytosis and  hypochromia  > check B 12 folate homocysteine.

## 2023-06-20 NOTE — PROVIDER CONTACT NOTE (OTHER) - SITUATION
Heparin drip infusing at 1400 units for PE & DVT. APTT done at 0400 and patient therapeutic at that time. Next APTT should have been at 0600 0n 6/21/2023. However, another APTT was done at 0600.

## 2023-06-20 NOTE — CONSULT NOTE ADULT - ASSESSMENT
56 yo male with hx of HTN, HLD, asthma; no known cardiac hx presents after sudden episode of falling, chest pressure, and SOB- found to have b/l PE with RV Strain    B/L PE    -unknown etiology  -given young age, would send b2 glycoprotein ab, lupus anticoagulant ab, cardiolipin ab, factor v leiden, and prothrombin gene mutation testing  -in our office we will send remainder of hypercoag which would currently not be accurate  -continuing IV heparin, likely dc on DOAC   -found to have ACUTE DVT R peroneal vein, will repeat in f/u in our office    Macrocytic anemia  -please check b12, folate level      Thank you for the courtesy of this consultation and we will continue to follow.    Rashel Mcdonald MD  [facility]  Cell: [phone]

## 2023-06-20 NOTE — CONSULT NOTE ADULT - SUBJECTIVE AND OBJECTIVE BOX
Reason for consult: PE    57M PMHx HTN, HLD, asthma; no known cardiac hx. Patient reports that he was talking to son at top of stairs; remembers starting to go down the stairs but then found himself at the bottom; (+) LOC; unknown head trauma; denies any pain s/p fall; after fall patient starting experiencing chest tightness and sob; no abdominal pain; no back pain; no weakness in arms or legs. CTA shows B/L pulmonary emboli, large clot burden on Rt. He reports recently changing the soles of his shoes and experiencing R leg pain for a few weeks. He is constantly walking, states he walks 15,000 steps daily. reports feeling chest heaviness overnight but resolved today. now sitting up in chair.      PAST MEDICAL & SURGICAL HISTORY:  Asthma  controlled; never intubated      Hydrocele  right      Neck pain  related to arthritis      Disorder of Eustachian tube, bilateral      HTN (hypertension)      Arthritis      H/O repair of rotator cuff  right 2007 left           FAMILY HISTORY:  FH: colon cancer (Father)  father, , age 60        Alochol: Denied  Smoking: Nonsmoker  Drug Use: Denied  Marital Status:         Allergies    horses-chest tightness (Short breath; Other)  sulfa drugs (Unknown)    Intolerances        MEDICATIONS  (STANDING):  albuterol    0.083% 2.5 milliGRAM(s) Nebulizer every 6 hours  budesonide 160 MICROgram(s)/formoterol 4.5 MICROgram(s) Inhaler 2 Puff(s) Inhalation two times a day  heparin  Infusion.  Unit(s)/Hr (16 mL/Hr) IV Continuous <Continuous>  melatonin 5 milliGRAM(s) Oral at bedtime    MEDICATIONS  (PRN):  heparin   Injectable 7000 Unit(s) IV Push every 6 hours PRN For aPTT less than 40  heparin   Injectable 3500 Unit(s) IV Push every 6 hours PRN For aPTT between 40 - 57  oxycodone    5 mG/acetaminophen 325 mG 1 Tablet(s) Oral every 4 hours PRN Moderate Pain (4 - 6)      ROS  + SOB, + chest pressure    T(C): 36.3 (23 @ 11:31), Max: 36.8 (23 @ 16:14)  HR: 96 (23 @ 13:08) (73 - 104)  BP: 127/93 (23 @ 12:00) (94/76 - 135/88)  RR: 12 (23 @ 12:00) (12 - )  SpO2: 96% (23 @ 12:00) (91% - 99%)  Wt(kg): --    PE  NAD  Awake, alert  Anicteric, MMM  RRR  CTAB  Abd soft, NT, ND  No c/c/e  No rash grossly  FROM                          12.9   9.13  )-----------( 204      ( 2023 05:47 )             38.8           140  |  105  |  11  ----------------------------<  128<H>  4.1   |  28  |  0.92    Ca    9.0      2023 05:47  Phos  3.6       Mg     2.0         TPro  6.5  /  Alb  3.4  /  TBili  0.5  /  DBili  x   /  AST  56<H>  /  ALT  125<H>  /  AlkPhos  97

## 2023-06-20 NOTE — PROGRESS NOTE ADULT - SUBJECTIVE AND OBJECTIVE BOX
Events Overnight:  Patient complaining of pleuritic chest pain, still sob, HR 90, bp ok    HPI:     This patient is a 57 year old male with PMH, HTN, HLD, Patient this am syncoped after walking down stairs and than    developed chest tightness, amnestic to what happened, no recent surgery, no recent covid, road in car  2 hours day prior to admission    no recent trauma    mild JADON, creatinine 1.3. improved to 0.9    CTA shows bialteral PEs with right heart strain, echo normal lv function, foley sign    increased troponin, inc d-dimer.       ROS syncope, slight chest tightness , no sob, no abdominal pain, no fever, no chills, no extremity pain, ROS otherwise negative    PMH:        previous shoulder surgery    HTN    Hypercholesterolemia    Family hx. - colon ca, no hx. clots    Social hx. non smoker, non drinker, works as      MEDICATIONS  (STANDING):  albuterol    0.083% 2.5 milliGRAM(s) Nebulizer every 6 hours  budesonide 160 MICROgram(s)/formoterol 4.5 MICROgram(s) Inhaler 2 Puff(s) Inhalation two times a day  heparin  Infusion.  Unit(s)/Hr (16 mL/Hr) IV Continuous <Continuous>  melatonin 5 milliGRAM(s) Oral at bedtime    MEDICATIONS  (PRN):  heparin   Injectable 7000 Unit(s) IV Push every 6 hours PRN For aPTT less than 40  heparin   Injectable 3500 Unit(s) IV Push every 6 hours PRN For aPTT between 40 - 57  oxycodone    5 mG/acetaminophen 325 mG 1 Tablet(s) Oral every 4 hours PRN Moderate Pain (4 - 6)    ICU Vital Signs Last 24 Hrs  T(C): 36.4 (20 Jun 2023 04:46), Max: 36.9 (19 Jun 2023 10:30)  T(F): 97.6 (20 Jun 2023 04:46), Max: 98.4 (19 Jun 2023 10:30)  HR: 73 (20 Jun 2023 07:00) (73 - 96)  BP: 122/93 (20 Jun 2023 07:00) (94/76 - 135/88)  BP(mean): 99 (20 Jun 2023 07:00) (81 - 106)  ABP: --  ABP(mean): --  RR: 17 (20 Jun 2023 07:00) (12 - 22)  SpO2: 99% (20 Jun 2023 07:00) (91% - 99%)    O2 Parameters below as of 19 Jun 2023 20:20  Patient On (Oxygen Delivery Method): nasal cannula, 4L    Physical Exam    General - looks well  HEENT nc/at  neck supple  lungs clear  cv rrr, no murmur  abdomen soft, non tender  extremity warm , no calf pain  skin no rash  neuro awake, alert appropriate    I&O's Summary    19 Jun 2023 07:01  -  20 Jun 2023 07:00  --------------------------------------------------------  IN: 1508 mL / OUT: 700 mL / NET: 808 mL    20 Jun 2023 07:01  -  20 Jun 2023 08:27  --------------------------------------------------------  IN: 0 mL / OUT: 850 mL / NET: -850 mL                        12.9   9.13  )-----------( 204      ( 20 Jun 2023 05:47 )             38.8       06-20    140  |  105  |  11  ----------------------------<  128<H>  4.1   |  28  |  0.92    Ca    9.0      20 Jun 2023 05:47  Phos  3.6     06-20  Mg     2.0     06-20    TPro  6.5  /  Alb  3.4  /  TBili  0.5  /  DBili  x   /  AST  56<H>  /  ALT  125<H>  /  AlkPhos  97  06-20      CARDIAC MARKERS ( 19 Jun 2023 06:05 )  x     / x     / 256 U/L / x     / x        DVT Prophylaxis:    IV Heparin                                                           Advanced Directives: FUll Code

## 2023-06-20 NOTE — PROGRESS NOTE ADULT - ASSESSMENT
Patient with PE, unclear inciting incident except drove 2 hours yesterday in car  with PE with syncope  PESI 65  mild AK improved with hydration  Patient with positive Gallagher sign.  on IV heparin  rv strain on echo  sat 90% on room air, but dyspneic and with pleuretic pain  will monitor in [facility] for decompensation  will  get hematologic w/u for hypercoaguable state  abdominal sono negative  venous doppler shows right peroneal dvt

## 2023-06-21 ENCOUNTER — TRANSCRIPTION ENCOUNTER (OUTPATIENT)
Age: 58
End: 2023-06-21

## 2023-06-21 VITALS
DIASTOLIC BLOOD PRESSURE: 98 MMHG | OXYGEN SATURATION: 100 % | HEART RATE: 82 BPM | SYSTOLIC BLOOD PRESSURE: 132 MMHG | RESPIRATION RATE: 20 BRPM

## 2023-06-21 LAB
ANION GAP SERPL CALC-SCNC: 7 MMOL/L — SIGNIFICANT CHANGE UP (ref 5–17)
APTT BLD: 35.7 SEC — HIGH (ref 27.5–35.5)
APTT BLD: 75.4 SEC — HIGH (ref 27.5–35.5)
BUN SERPL-MCNC: 12 MG/DL — SIGNIFICANT CHANGE UP (ref 7–23)
CALCIUM SERPL-MCNC: 9 MG/DL — SIGNIFICANT CHANGE UP (ref 8.5–10.1)
CHLORIDE SERPL-SCNC: 107 MMOL/L — SIGNIFICANT CHANGE UP (ref 96–108)
CO2 SERPL-SCNC: 26 MMOL/L — SIGNIFICANT CHANGE UP (ref 22–31)
CREAT SERPL-MCNC: 0.8 MG/DL — SIGNIFICANT CHANGE UP (ref 0.5–1.3)
EGFR: 105 ML/MIN/1.73M2 — SIGNIFICANT CHANGE UP
FOLATE SERPL-MCNC: >20 NG/ML — SIGNIFICANT CHANGE UP
GLUCOSE SERPL-MCNC: 107 MG/DL — HIGH (ref 70–99)
HCT VFR BLD CALC: 37.5 % — LOW (ref 39–50)
HGB BLD-MCNC: 12.4 G/DL — LOW (ref 13–17)
MCHC RBC-ENTMCNC: 33.1 GM/DL — SIGNIFICANT CHANGE UP (ref 32–36)
MCHC RBC-ENTMCNC: 34.1 PG — HIGH (ref 27–34)
MCV RBC AUTO: 103 FL — HIGH (ref 80–100)
PLATELET # BLD AUTO: 196 K/UL — SIGNIFICANT CHANGE UP (ref 150–400)
POTASSIUM SERPL-MCNC: 3.9 MMOL/L — SIGNIFICANT CHANGE UP (ref 3.5–5.3)
POTASSIUM SERPL-SCNC: 3.9 MMOL/L — SIGNIFICANT CHANGE UP (ref 3.5–5.3)
RBC # BLD: 3.64 M/UL — LOW (ref 4.2–5.8)
RBC # FLD: 13.7 % — SIGNIFICANT CHANGE UP (ref 10.3–14.5)
SODIUM SERPL-SCNC: 140 MMOL/L — SIGNIFICANT CHANGE UP (ref 135–145)
VIT B12 SERPL-MCNC: 431 PG/ML — SIGNIFICANT CHANGE UP (ref 232–1245)
WBC # BLD: 5.36 K/UL — SIGNIFICANT CHANGE UP (ref 3.8–10.5)
WBC # FLD AUTO: 5.36 K/UL — SIGNIFICANT CHANGE UP (ref 3.8–10.5)

## 2023-06-21 PROCEDURE — 99239 HOSP IP/OBS DSCHRG MGMT >30: CPT

## 2023-06-21 RX ORDER — APIXABAN 2.5 MG/1
10 TABLET, FILM COATED ORAL
Refills: 0 | Status: DISCONTINUED | OUTPATIENT
Start: 2023-06-21 | End: 2023-06-21

## 2023-06-21 RX ORDER — APIXABAN 2.5 MG/1
2 TABLET, FILM COATED ORAL
Qty: 65 | Refills: 3
Start: 2023-06-21 | End: 2023-10-18

## 2023-06-21 RX ORDER — APIXABAN 2.5 MG/1
2 TABLET, FILM COATED ORAL
Qty: 0 | Refills: 0 | DISCHARGE
Start: 2023-06-21

## 2023-06-21 RX ADMIN — APIXABAN 10 MILLIGRAM(S): 2.5 TABLET, FILM COATED ORAL at 10:35

## 2023-06-21 RX ADMIN — BUDESONIDE AND FORMOTEROL FUMARATE DIHYDRATE 2 PUFF(S): 160; 4.5 AEROSOL RESPIRATORY (INHALATION) at 08:33

## 2023-06-21 RX ADMIN — ALBUTEROL 2.5 MILLIGRAM(S): 90 AEROSOL, METERED ORAL at 08:32

## 2023-06-21 NOTE — PROGRESS NOTE ADULT - REASON FOR ADMISSION
B/L pulmonary elboli

## 2023-06-21 NOTE — PROGRESS NOTE ADULT - SUBJECTIVE AND OBJECTIVE BOX
Hematology/Oncology    Pt seen, feeling well, like wt has been removed off of chest    MEDICATIONS  (STANDING):  albuterol    0.083% 2.5 milliGRAM(s) Nebulizer every 6 hours  apixaban 10 milliGRAM(s) Oral two times a day  budesonide 160 MICROgram(s)/formoterol 4.5 MICROgram(s) Inhaler 2 Puff(s) Inhalation two times a day  melatonin 5 milliGRAM(s) Oral at bedtime    MEDICATIONS  (PRN):  oxycodone    5 mG/acetaminophen 325 mG 1 Tablet(s) Oral every 4 hours PRN Moderate Pain (4 - 6)      ROS  No fever, sweats, chills  No epistaxis, HA, sore throat  No CP, SOB, cough, sputum  No n/v/d, abd pain, melena, hematochezia  No edema  No rash  No anxiety  No back pain, joint pain  No bleeding, bruising  No dysuria, hematuria    Vital Signs Last 24 Hrs  T(C): 36.6 (21 Jun 2023 05:00), Max: 37.4 (20 Jun 2023 16:27)  T(F): 97.9 (21 Jun 2023 05:00), Max: 99.4 (20 Jun 2023 16:27)  HR: 82 (21 Jun 2023 09:00) (70 - 108)  BP: 132/98 (21 Jun 2023 09:00) (108/90 - 144/89)  BP(mean): 92 (21 Jun 2023 09:00) (80 - 108)  RR: 20 (21 Jun 2023 09:00) (12 - 25)  SpO2: 100% (21 Jun 2023 09:00) (94% - 100%)    Parameters below as of 21 Jun 2023 08:00  Patient On (Oxygen Delivery Method): room air        PE  NAD  Awake, alert  Anicteric, MMM  RRR  CTAB  Abd soft, NT, ND  No c/c/e  No rash grossly  FROM                          12.4   5.36  )-----------( 196      ( 21 Jun 2023 06:14 )             37.5       06-21    140  |  107  |  12  ----------------------------<  107<H>  3.9   |  26  |  0.80    Ca    9.0      21 Jun 2023 06:14  Phos  3.6     06-20  Mg     2.0     06-20    TPro  6.5  /  Alb  3.4  /  TBili  0.5  /  DBili  x   /  AST  56<H>  /  ALT  125<H>  /  AlkPhos  97  06-20

## 2023-06-21 NOTE — DISCHARGE NOTE PROVIDER - PROVIDER TOKENS
PROVIDER:[TOKEN:[[id number]],ESTABLISHEDPATIENT:[T]],FREE:[LAST:[nuvia],FIRST:[cassy],PHONE:[phone]],FAX:[(   )    -],ADDRESS:[[address] [place] [address]],ESTABLISHEDPATIENT:[T]]

## 2023-06-21 NOTE — DISCHARGE NOTE NURSING/CASE MANAGEMENT/SOCIAL WORK - NSDCPEFALRISK_GEN_ALL_CORE
For information on Fall & Injury Prevention, visit: [email or web address] OR  [email or web address] OR  https://www.cdc.gov/steadi/patient.html

## 2023-06-21 NOTE — DISCHARGE NOTE PROVIDER - NSDCCPCAREPLAN_GEN_ALL_CORE_FT
PRINCIPAL DISCHARGE DIAGNOSIS  Diagnosis: Pulmonary embolism  Assessment and Plan of Treatment:      negative...

## 2023-06-21 NOTE — DISCHARGE NOTE PROVIDER - ATTENDING DISCHARGE PHYSICAL EXAMINATION:
Patient awake and alert  ambulating on room air  pleuritic pain is improved  stable for discharge  f/u with hematology and cardiology  script for eliquis is sent

## 2023-06-21 NOTE — PROGRESS NOTE ADULT - SUBJECTIVE AND OBJECTIVE BOX
Patient is a 57y old  Male who presents with a chief complaint of B/L pulmonary elboli (20 Jun 2023 13:44)    YU LACEY   57y    Male  All allergies reviewed horses-chest tightness (Short breath; Other)  sulfa drugs (Unknown)    BRIEF HOSPITAL COURSE:  Mr. Lacey is a 56 YO M w/pmhx of HTN and asthma who presented to ED after syncope from walking down the stairs. Patient had chest tightness. Patient had a 2 hour car ride prior to admission. Patient had CTA showing b/l PE w/R heart strain w/Gallagher's sign on TTE w/elevated cardiac enzymes indicating sub-massive PE    Interval History:   - Renal function improved  - on RA  - Cardiac enzymes downtrending     PAST MEDICAL & SURGICAL HISTORY:  Asthma  controlled; never intubated  Hydrocele  right  Neck pain  related to arthritis  Disorder of Eustachian tube, bilateral  HTN (hypertension)  Arthritis  H/O repair of rotator cuff  right 2007 left 2016    Review of Systems:                       All other ROS are negative    Medications:  MEDICATIONS  (STANDING):  albuterol    0.083% 2.5 milliGRAM(s) Nebulizer every 6 hours  budesonide 160 MICROgram(s)/formoterol 4.5 MICROgram(s) Inhaler 2 Puff(s) Inhalation two times a day  heparin  Infusion.  Unit(s)/Hr (16 mL/Hr) IV Continuous <Continuous>  melatonin 5 milliGRAM(s) Oral at bedtime    MEDICATIONS  (PRN):  heparin   Injectable 7000 Unit(s) IV Push every 6 hours PRN For aPTT less than 40  heparin   Injectable 3500 Unit(s) IV Push every 6 hours PRN For aPTT between 40 - 57  oxycodone    5 mG/acetaminophen 325 mG 1 Tablet(s) Oral every 4 hours PRN Moderate Pain (4 - 6)    Weight (kg): 87.4 (06-20-23 @ 13:44)  BMI (kg/m2): 27.6 (06-20-23 @ 13:44)  ICU Vital Signs Last 24 Hrs  T(C): 36.8 (20 Jun 2023 20:00), Max: 37.4 (20 Jun 2023 16:27)  T(F): 98.2 (20 Jun 2023 20:00), Max: 99.4 (20 Jun 2023 16:27)  HR: 78 (20 Jun 2023 23:00) (73 - 108)  BP: 121/88 (20 Jun 2023 23:00) (94/76 - 144/89)  BP(mean): 97 (20 Jun 2023 23:00) (81 - 102)  RR: 20 (20 Jun 2023 23:00) (12 - 27)  SpO2: 94% (20 Jun 2023 23:00) (91% - 100%)  O2 Parameters below as of 20 Jun 2023 08:27  Patient On (Oxygen Delivery Method): nasal cannula, 4lpm    I&O's Detail    19 Jun 2023 07:01  -  20 Jun 2023 07:00  --------------------------------------------------------  IN:    Heparin Infusion: 134 mL    Lactated Ringers: 924 mL    Oral Fluid: 450 mL  Total IN: 1508 mL    OUT:    Voided (mL): 700 mL  Total OUT: 700 mL    Total NET: 808 mL    20 Jun 2023 07:01  -  21 Jun 2023 00:34  --------------------------------------------------------  IN:  Total IN: 0 mL    OUT:    Voided (mL): 850 mL  Total OUT: 850 mL    Total NET: -850 mL    LABS:                          12.9   9.13  )-----------( 204      ( 20 Jun 2023 05:47 )             38.8     06-20    140  |  105  |  11  ----------------------------<  128<H>  4.1   |  28  |  0.92    Ca    9.0      20 Jun 2023 05:47  Phos  3.6     06-20  Mg     2.0     06-20    TPro  6.5  /  Alb  3.4  /  TBili  0.5  /  DBili  x   /  AST  56<H>  /  ALT  125<H>  /  AlkPhos  97  06-20    CARDIAC MARKERS ( 19 Jun 2023 06:05 )  x     / x     / 256 U/L / x     / x        PT/INR - ( 19 Jun 2023 06:05 )   PT: 10.4 sec;   INR: 0.90 ratio    PTT - ( 20 Jun 2023 05:47 )  PTT:102.4 sec    Physical Examination:  General: No acute distress  HEENT: Pupils equal, reactive to light.  Symmetric  PULM: Clear to auscultation bilaterally, no significant sputum production  CVS: Regular rate and rhythm, no murmurs, rubs, or gallops  ABD: Soft, nondistended, nontender, normoactive bowel sounds, no masses  EXT: No edema, nontender  SKIN: Warm and well perfused  NEURO: A&Ox3  RADIOLOGY:   < from: CT Angio Chest PE Protocol w/ IV Cont (06.19.23 @ 07:10) >    ACC: [id number] EXAM:  CT ANGIO CHEST PULM [facility]   ORDERED BY: PAULO JESSICA     PROCEDURE DATE:  06/19/2023      INTERPRETATION:  CLINICAL INFORMATION: 57 years old. Male. Shortness of   breath r/o pe.    COMPARISON: None.    PROCEDURE:  IV Contrast: Omnipaque 350 / 70 cc administered / 30 cc discarded.  Oral Contrast: NONE  Complications: None reported at time of study completion    PROCEDURE:  CT Angiography of the Chest. Sagittal and coronal reformats were   performed as well as 3D (MIP) reconstructions.    FINDINGS:    LUNGS AND AIRWAYS: Patent central airways.. There are mild nonspecific   groundglass opacities in the right upper lobe. There is prominent right   lower lobe periosteophyte fibrosis.  PLEURA: No pleural effusion.  MEDIASTINUM AND SCOOBY: No lymphadenopathy.  VESSELS: Multiple pulmonary emboli, in the right upper, middle and lower   lobar and segmental branches. Left-sided pulmonary emboli in the left   upper, lower lobe segmental branches are also present.  HEART: Heart size is normal. No pericardial effusion. Coronary artery   calcifications. There is CT evidence of right heart strain.  CHEST WALL AND LOWER NECK: Within normal limits.  VISUALIZED UPPER ABDOMEN: Grossly unremarkable.  BONES: Degenerative changes in the spine.    IMPRESSION:    Multiple bilateral pulmonary emboli. There is CT evidence for heart   strain.    Findings discussed with Dr. JESSICA on 6/19/2023 7:51 AM with readback.    --- End of Report ---    EUNICE BILLINGS MD; AttendingRadiologist  This document has been electronically signed. Jun 19 2023  8:04AM    < end of copied text >    < from: TTE Echo Complete w/o Contrast w/ Doppler (06.19.23 @ 08:34) >   Findings     Mitral Valve   The mitral valve leaflets appear thickened.   Trace mitral regurgitation is present.   EA reversal of the mitral inflow consistent with reduced compliance of   the   left ventricle.     Aortic Valve   Mild aortic sclerosis is present with normal valvular opening.     Tricuspid Valve   The tricuspid valve leaflets are thin and pliable; valve motion is   normal.   Mild (1+) tricuspid valve regurgitation is present. Pulmonary artery   systolic pressures are normal - PASP =38 mmHg.   Mild pulmonary hypertension.     Pulmonic Valve   Normal appearing pulmonic valve structure.   Trace pulmonic valvular regurgitation is present.     Left Atrium   Normal appearing left atrium.     Left Ventricle   The left ventricle is normal in size, wall thickness, wall motion and   contractility.   Estimated left ventricular ejection fraction is 70 %.     Right Atrium   The right atrium appears mildly dilated.     Right Ventricle   The right ventricle is mildly dilated.   Gallagher's sign is noted.     Pericardial Effusion   No evidence of pericardial effusion.     Pleural Effusion   No evidence of pleural effusion.     Miscellaneous   The IVC is dilated.     Impression     Summary     The right ventricle is moderately dilated.   Right ventricular systolic function is reduced, w/ sparing of   contractility at the apex consistent w/   Gallagher's sign.   Mild (1+) tricuspid valve regurgitation is present. Pulmonary artery   systolic pressures are normal - PASP =38 mmHg.   The right atrium appears mildly dilated.     The left ventricle is normal in size, wall thickness, wall motion and   contractility.   Estimated left ventricular ejection fraction is 70 %.   Mild aortic sclerosis.     Signature     ----------------------------------------------------------------   Electronically signed by John Dwyer MD(Interpreting   physician) on 06/19/2023 09:16 AM   ----------------------------------------------------------------    < end of copied text >    Appropriate Imaging reviewed.

## 2023-06-21 NOTE — DISCHARGE NOTE PROVIDER - NSDCMRMEDTOKEN_GEN_ALL_CORE_FT
Advair Diskus 100 mcg-50 mcg inhalation powder: 1 puff(s) inhaled once a day  amLODIPine 5 mg oral tablet: 1 tab(s) orally once a day  apixaban 5 mg oral tablet: 2 tab(s) orally 2 times a day 2 tablets twice a day for 5 days, than 1 tablet orally twice a day  atorvastatin 20 mg oral tablet: 1 tab(s) orally once a day  Multiple Vitamins oral tablet: 1 tab(s) orally once a day  Ventolin HFA 90 mcg/inh inhalation aerosol: 2 puff(s) inhaled every 6 hours, As Needed

## 2023-06-21 NOTE — PROGRESS NOTE ADULT - ASSESSMENT
56 yo male with hx of HTN, HLD, asthma; no known cardiac hx presents after sudden episode of falling, chest pressure, and SOB- found to have b/l PE with RV Strain    B/L PE    -unknown etiology and will send hypercoag w/u in our office in f/u  -transitioning to doac   -found to have ACUTE DVT R peroneal vein, will repeat in f/u in our office    Macrocytic anemia  -please check b12, folate level    will arrange short interval f/u after dc    Thank you for the courtesy of this consultation and we will continue to follow.    Rashel Mcdonald MD  [facility]  Cell: [phone]

## 2023-06-21 NOTE — PROGRESS NOTE ADULT - ASSESSMENT
Assessment/Plan:  Mr. Ramires is a 56 YO M w/pmhx of HTN and asthma who presented to ED after syncope from walking down the stairs. Patient had chest tightness. Patient had a 2 hour car ride prior to admission. Patient had CTA showing b/l PE w/R heart strain w/Gallagher's sign on TTE w/elevated cardiac enzymes indicating sub-massive PE    Submassive PE  R peroneal DVT  JADON    - Heparin gtt for full AC, transition to DOAC  - TTE shows RV strain  - Cardiac enzymes improving   - PESI score 65  - LE duplex shows DVT  - Hypercoagulable w/u   - JADON is improving. Strict I/Os, goal UOP >0.5cc/kg/hr. Trend renal function and electrolytes, replete as needed. Avoid nephrotoxic agents  - Pain control   - Monitor hemodynamics and respiratory status closely     Time spent on this patient encounter, which includes documenting this note in the electronic medical record, was 52 minutes including assessing the presenting problems with associated risks, reviewing the medical record to prepare for the encounter, and meeting face to face with the patient to obtain additional history.  I have also performed an appropriate physical exam, made interventions listed and ordered and interpreted appropriate diagnostic studies as documented.     To improve communication and patient safety, I have coordinated care with the multidisciplinary team including the bedside nurse, appropriate attending of record and consultants as needed.      HEALTH ISSUES - PROBLEM Dx:  Suspected deep vein thrombosis (DVT)    Pulmonary thromboembolism

## 2023-06-21 NOTE — DISCHARGE NOTE PROVIDER - CARE PROVIDER_API CALL
Mundo Awan  Cardiology  [address] [facility] [place] [address]  Phone: [phone]  Fax: [phone]  Established Patient  Follow Up Time:     cassy pedersen  [address] [place] [address]  Phone: [phone]  Fax: (   )    -  Established Patient  Follow Up Time:

## 2023-06-21 NOTE — DISCHARGE NOTE NURSING/CASE MANAGEMENT/SOCIAL WORK - PATIENT PORTAL LINK FT
You can access the FollowMyHealth Patient Portal offered by [facility] by registering at the following website: [email or web address]. By joining [facility]’s FollowMyHealth portal, you will also be able to view your health information using other applications (apps) compatible with our system.

## 2023-06-21 NOTE — DISCHARGE NOTE PROVIDER - HOSPITAL COURSE
This patient is a 57 year old male with PMH, HTN, HLD, Patient this am syncoped after walking down stairs and than    developed chest tightness, amnestic to what happened, no recent surgery, no recent covid, road in car  2 hours day prior to admission    no recent trauma    mild JADON, creatinine 1.3. improved to 0.9    CTA shows bialteral PEs with right heart strain, echo normal lv function, foley sign  patient now on room air ambulating with minimal sob  transitioned to [facility], to be d/c home follow up with hematology and cardiology

## 2023-06-22 LAB
APTT 50/50 2HOUR INCUB: SIGNIFICANT CHANGE UP (ref 27.5–36.5)
APTT BLD: 33.6 SEC — SIGNIFICANT CHANGE UP (ref 27.5–35.5)
APTT BLD: SIGNIFICANT CHANGE UP (ref 27.5–36.5)
B2 GLYCOPROT1 AB SER QL: NEGATIVE — SIGNIFICANT CHANGE UP
CARDIOLIPIN AB SER-ACNC: NEGATIVE — SIGNIFICANT CHANGE UP
DRVVT RATIO: 0.65 RATIO — SIGNIFICANT CHANGE UP (ref 0–1.21)
DRVVT SCREEN TO CONFIRM RATIO: SIGNIFICANT CHANGE UP
NORMALIZED SCT PPP-RTO: 1.02 RATIO — SIGNIFICANT CHANGE UP (ref 0–1.16)
NORMALIZED SCT PPP-RTO: SIGNIFICANT CHANGE UP
PAT CTL 2H: SIGNIFICANT CHANGE UP (ref 27.5–36.5)
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
## 2023-06-26 DIAGNOSIS — N17.9 ACUTE KIDNEY FAILURE, UNSPECIFIED: ICD-10-CM

## 2023-06-26 DIAGNOSIS — E78.5 HYPERLIPIDEMIA, UNSPECIFIED: ICD-10-CM

## 2023-06-26 DIAGNOSIS — E86.0 DEHYDRATION: ICD-10-CM
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
## 2023-06-26 DIAGNOSIS — I82.451 ACUTE EMBOLISM AND THROMBOSIS OF RIGHT PERONEAL VEIN: ICD-10-CM

## 2023-06-26 DIAGNOSIS — D53.9 NUTRITIONAL ANEMIA, UNSPECIFIED: ICD-10-CM

## 2023-06-26 DIAGNOSIS — I07.1 RHEUMATIC TRICUSPID INSUFFICIENCY: ICD-10-CM
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
## 2023-06-26 DIAGNOSIS — K76.0 FATTY (CHANGE OF) LIVER, NOT ELSEWHERE CLASSIFIED: ICD-10-CM

## 2023-06-26 DIAGNOSIS — I26.99 OTHER PULMONARY EMBOLISM WITHOUT ACUTE COR PULMONALE: ICD-10-CM

## 2023-06-26 DIAGNOSIS — I10 ESSENTIAL (PRIMARY) HYPERTENSION: ICD-10-CM

## 2023-06-26 DIAGNOSIS — E87.20 ACIDOSIS, UNSPECIFIED: ICD-10-CM

## 2023-06-26 DIAGNOSIS — R73.9 HYPERGLYCEMIA, UNSPECIFIED: ICD-10-CM

## 2023-09-20 ENCOUNTER — APPOINTMENT (OUTPATIENT)
Dept: INTERNAL MEDICINE | Facility: CLINIC | Age: 58
End: 2023-09-20
Payer: COMMERCIAL

## 2023-09-20 VITALS
WEIGHT: 190 LBS | BODY MASS INDEX: 26.6 KG/M2 | OXYGEN SATURATION: 98 % | DIASTOLIC BLOOD PRESSURE: 79 MMHG | HEART RATE: 81 BPM | TEMPERATURE: 97.9 F | HEIGHT: 71 IN | SYSTOLIC BLOOD PRESSURE: 131 MMHG | RESPIRATION RATE: 16 BRPM

## 2023-09-20 DIAGNOSIS — R91.8 OTHER NONSPECIFIC ABNORMAL FINDING OF LUNG FIELD: ICD-10-CM

## 2023-09-20 DIAGNOSIS — I26.99 OTHER PULMONARY EMBOLISM W/OUT ACUTE COR PULMONALE: ICD-10-CM

## 2023-09-20 PROCEDURE — ZZZZZ: CPT

## 2023-09-20 PROCEDURE — 94727 GAS DIL/WSHOT DETER LNG VOL: CPT

## 2023-09-20 PROCEDURE — 99204 OFFICE O/P NEW MOD 45 MIN: CPT | Mod: 25

## 2023-09-20 PROCEDURE — 94060 EVALUATION OF WHEEZING: CPT

## 2023-09-20 PROCEDURE — 94729 DIFFUSING CAPACITY: CPT

## 2023-09-20 RX ORDER — CEPHALEXIN 500 MG/1
500 CAPSULE ORAL
Qty: 10 | Refills: 1 | Status: DISCONTINUED | COMMUNITY
Start: 2019-12-13 | End: 2023-09-20

## 2023-09-20 RX ORDER — METHYLPREDNISOLONE 4 MG/1
4 TABLET ORAL
Qty: 1 | Refills: 0 | Status: DISCONTINUED | COMMUNITY
Start: 2022-09-20 | End: 2023-09-20

## 2023-09-20 RX ORDER — CYCLOBENZAPRINE HYDROCHLORIDE 5 MG/1
5 TABLET, FILM COATED ORAL
Qty: 14 | Refills: 0 | Status: DISCONTINUED | COMMUNITY
Start: 2019-10-15 | End: 2023-09-20

## 2023-09-20 RX ORDER — IBUPROFEN 600 MG/1
600 TABLET, FILM COATED ORAL 3 TIMES DAILY
Qty: 15 | Refills: 1 | Status: DISCONTINUED | COMMUNITY
Start: 2019-12-13 | End: 2023-09-20

## 2023-09-20 RX ORDER — PREDNISONE 10 MG/1
10 TABLET ORAL
Refills: 0 | Status: ACTIVE | COMMUNITY

## 2023-09-21 PROBLEM — R91.8 ABNORMAL CT SCAN, LUNG: Status: ACTIVE | Noted: 2023-09-21

## 2023-09-21 RX ORDER — APIXABAN 5 MG/1
5 TABLET, FILM COATED ORAL
Refills: 0 | Status: ACTIVE | COMMUNITY
Start: 2023-09-21

## 2023-12-20 ENCOUNTER — APPOINTMENT (OUTPATIENT)
Dept: INTERNAL MEDICINE | Facility: CLINIC | Age: 58
End: 2023-12-20

## 2024-06-19 ENCOUNTER — APPOINTMENT (OUTPATIENT)
Dept: ORTHOPEDIC SURGERY | Facility: CLINIC | Age: 59
End: 2024-06-19
Payer: OTHER MISCELLANEOUS

## 2024-06-19 VITALS — HEIGHT: 71 IN | WEIGHT: 175 LBS | BODY MASS INDEX: 24.5 KG/M2

## 2024-06-19 DIAGNOSIS — I10 ESSENTIAL (PRIMARY) HYPERTENSION: ICD-10-CM

## 2024-06-19 DIAGNOSIS — E78.00 PURE HYPERCHOLESTEROLEMIA, UNSPECIFIED: ICD-10-CM

## 2024-06-19 DIAGNOSIS — Z98.890 OTHER SPECIFIED POSTPROCEDURAL STATES: ICD-10-CM

## 2024-06-19 DIAGNOSIS — S46.911A STRAIN OF UNSPECIFIED MUSCLE, FASCIA AND TENDON AT SHOULDER AND UPPER ARM LEVEL, RIGHT ARM, INITIAL ENCOUNTER: ICD-10-CM

## 2024-06-19 DIAGNOSIS — Z86.711 PERSONAL HISTORY OF PULMONARY EMBOLISM: ICD-10-CM

## 2024-06-19 PROCEDURE — 73010 X-RAY EXAM OF SHOULDER BLADE: CPT | Mod: RT

## 2024-06-19 PROCEDURE — 73030 X-RAY EXAM OF SHOULDER: CPT | Mod: RT

## 2024-06-19 PROCEDURE — 99214 OFFICE O/P EST MOD 30 MIN: CPT

## 2024-06-19 RX ORDER — ALBUTEROL SULFATE 90 UG/1
INHALANT RESPIRATORY (INHALATION)
Refills: 0 | Status: ACTIVE | COMMUNITY

## 2024-06-19 RX ORDER — AMLODIPINE BESYLATE 5 MG/1
TABLET ORAL
Refills: 0 | Status: ACTIVE | COMMUNITY

## 2024-06-19 RX ORDER — METHYLPREDNISOLONE 4 MG/1
4 TABLET ORAL
Qty: 1 | Refills: 0 | Status: ACTIVE | COMMUNITY
Start: 2024-06-19 | End: 1900-01-01

## 2024-06-19 RX ORDER — ASPIRIN 81 MG
81 TABLET, DELAYED RELEASE (ENTERIC COATED) ORAL
Refills: 0 | Status: ACTIVE | COMMUNITY

## 2024-06-19 NOTE — ASSESSMENT
[FreeTextEntry1] : We reviewed the findings and the history. Questions were answered and concerns addressed. The options were outlined. A MDP is planned. An MRI is a consideration, though metal artifact will be an issue. PT could be beneficial. 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 He has been WFD in his  position.  Patient was seen by Dr. Cosme Whitt. Patient was seen by Smiley MORALES under the supervision of Dr. Cosme Whitt. Progress note was completed by Smiley MORALES. Entered by Sharon Hanson acting as scribe.

## 2024-06-19 NOTE — REASON FOR VISIT
[FreeTextEntry2] : WC 5/21/24 This is a 58 year old RHD M  with right shoulder pain after a piece of machinery he was using kicked back.  He felt a sharp sensation with a pop and immediate pain.  Pushing a rolling cart through a door two weeks later caused another pop.  He had a rotator cuff repair with Dr. Whitt in 2010 that completely healed.  He had no issues.  On 4/7/2016, Dr Whitt performed a Left Shoulder Arthroscopy, Synovectomy, Biceps Tenodesis, Subscapularis Repair, Subacromial Decompression, Medium Supraspinatus/Infraspinatus Repair (), Distal Clavicle Resection after a WC injury on 11/27/15.  There was a 45% SLU.  This side is OK.  Reaching and lifting with the right arm is painful.  It can be hard to sleep at night.  No numbness.

## 2024-06-19 NOTE — PHYSICAL EXAM
[Right] : right shoulder [Sitting] : sitting [Mild] : mild [5 ___] : forward flexion 5[unfilled]/5 [5___] : external rotation 5[unfilled]/5 [] : no sensory deficits [de-identified] : No pain with weak bellypress.  [TWNoteComboBox4] : passive forward flexion 160 degrees [TWNoteComboBox6] : internal rotation L1 [de-identified] : external rotation 75 degrees

## 2024-06-19 NOTE — HISTORY OF PRESENT ILLNESS
[Work related] : work related [3] : 3 [0] : 0 [Sharp] : sharp [Intermittent] : intermittent [Household chores] : household chores [Sleep] : sleep [Full time] : Work status: full time [de-identified] : WC DOI 5/21/24: Patient was using a sawzall at work and it kicked back causing him to have a sharp pain in his right shoulder. [] : no [FreeTextEntry1] : Right shoulder [FreeTextEntry3] : 5/21/24 [de-identified] : pushing up [de-identified] : 2013 [de-identified] : Dr Whitt [de-identified] : 2013 [de-identified] : no recent testing

## 2024-06-19 NOTE — IMAGING
[Right] : right shoulder [FreeTextEntry1] : The GH is OK.  A DCR is suggested.  Two metal anchors are noted, one Fastin in the GT and one laterally. [FreeTextEntry5] : There is a Type I-II acromion.

## 2024-06-19 NOTE — WORK
[Sprain/Strain] : sprain/strain [Torn Ligament/Tendon/Muscle] : torn ligament, tendon or muscle [Was the competent medical cause of the injury] : was the competent medical cause of the injury [Are consistent with the injury] : are consistent with the injury [Consistent with my objective findings] : consistent with my objective findings [Partial] : partial [FreeTextEntry1] : He is WFD.

## 2024-11-13 NOTE — H&P ADULT - NSICDXFAMILYHX_GEN_ALL_CORE_FT
Pharmacy Medication Reconciliation      ~Medication reconciliation updated and complete per patient at bedside   ~Allergies have been verified and updated   ~No oral ABX within the last 30 days  ~Patient home pharmacy :  [facility]      ~Anticoagulants (rivaroxaban, apixaban, edoxaban, dabigatran, warfarin, enoxaparin) taken in the last 14 days? No       FAMILY HISTORY:  Father  Still living? Unknown  FH: colon cancer, Age at diagnosis: Age Unknown

## 2025-07-30 ENCOUNTER — APPOINTMENT (OUTPATIENT)
Dept: ORTHOPEDIC SURGERY | Facility: CLINIC | Age: 60
End: 2025-07-30
Payer: OTHER MISCELLANEOUS

## 2025-07-30 VITALS — WEIGHT: 185 LBS | BODY MASS INDEX: 25.9 KG/M2 | HEIGHT: 71 IN

## 2025-07-30 DIAGNOSIS — S46.911A STRAIN OF UNSPECIFIED MUSCLE, FASCIA AND TENDON AT SHOULDER AND UPPER ARM LEVEL, RIGHT ARM, INITIAL ENCOUNTER: ICD-10-CM

## 2025-07-30 DIAGNOSIS — Z98.890 OTHER SPECIFIED POSTPROCEDURAL STATES: ICD-10-CM

## 2025-07-30 PROCEDURE — 99214 OFFICE O/P EST MOD 30 MIN: CPT

## 2025-07-30 PROCEDURE — 73030 X-RAY EXAM OF SHOULDER: CPT | Mod: RT

## 2025-07-30 PROCEDURE — 73010 X-RAY EXAM OF SHOULDER BLADE: CPT | Mod: RT

## 2025-07-30 RX ORDER — METHYLPREDNISOLONE 4 MG/1
4 TABLET ORAL
Qty: 1 | Refills: 0 | Status: ACTIVE | COMMUNITY
Start: 2025-07-30 | End: 1900-01-01

## 2025-08-20 ENCOUNTER — RESULT REVIEW (OUTPATIENT)
Age: 60
End: 2025-08-20

## 2025-09-08 ENCOUNTER — NON-APPOINTMENT (OUTPATIENT)
Age: 60
End: 2025-09-08

## 2025-09-10 ENCOUNTER — APPOINTMENT (OUTPATIENT)
Dept: ORTHOPEDIC SURGERY | Facility: CLINIC | Age: 60
End: 2025-09-10
Payer: OTHER MISCELLANEOUS

## 2025-09-10 VITALS — WEIGHT: 185 LBS | BODY MASS INDEX: 25.9 KG/M2 | HEIGHT: 71 IN

## 2025-09-10 DIAGNOSIS — M75.121 COMPLETE ROTATOR CUFF TEAR OR RUPTURE OF RIGHT SHOULDER, NOT SPECIFIED AS TRAUMATIC: ICD-10-CM

## 2025-09-10 DIAGNOSIS — S46.811D STRAIN OF OTHER MUSCLES, FASCIA AND TENDONS AT SHOULDER AND UPPER ARM LEVEL, RIGHT ARM, SUBSEQUENT ENCOUNTER: ICD-10-CM

## 2025-09-10 DIAGNOSIS — S43.003A UNSPECIFIED SUBLUXATION OF UNSPECIFIED SHOULDER JOINT, INITIAL ENCOUNTER: ICD-10-CM

## 2025-09-10 DIAGNOSIS — Z98.890 OTHER SPECIFIED POSTPROCEDURAL STATES: ICD-10-CM

## 2025-09-10 PROCEDURE — 99214 OFFICE O/P EST MOD 30 MIN: CPT
